# Patient Record
Sex: FEMALE | Race: WHITE | NOT HISPANIC OR LATINO
[De-identification: names, ages, dates, MRNs, and addresses within clinical notes are randomized per-mention and may not be internally consistent; named-entity substitution may affect disease eponyms.]

---

## 2017-03-20 ENCOUNTER — APPOINTMENT (OUTPATIENT)
Dept: HEART AND VASCULAR | Facility: CLINIC | Age: 82
End: 2017-03-20

## 2017-03-20 VITALS
WEIGHT: 128 LBS | HEART RATE: 86 BPM | BODY MASS INDEX: 25.8 KG/M2 | DIASTOLIC BLOOD PRESSURE: 80 MMHG | SYSTOLIC BLOOD PRESSURE: 160 MMHG | HEIGHT: 59 IN | OXYGEN SATURATION: 97 %

## 2017-03-20 DIAGNOSIS — I10 ESSENTIAL (PRIMARY) HYPERTENSION: ICD-10-CM

## 2017-03-20 DIAGNOSIS — S06.5X9A TRAUMATIC SUBDURAL HEMORRHAGE WITH LOSS OF CONSCIOUSNESS OF UNSPECIFIED DURATION, INITIAL ENCOUNTER: ICD-10-CM

## 2017-03-20 DIAGNOSIS — Z78.9 OTHER SPECIFIED HEALTH STATUS: ICD-10-CM

## 2017-03-20 DIAGNOSIS — Z85.828 PERSONAL HISTORY OF OTHER MALIGNANT NEOPLASM OF SKIN: ICD-10-CM
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
## 2017-03-20 DIAGNOSIS — H81.10 BENIGN PAROXYSMAL VERTIGO, UNSPECIFIED EAR: ICD-10-CM

## 2017-03-21 PROBLEM — Z78.9 NON-SMOKER: Status: ACTIVE | Noted: 2017-03-21
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
## 2017-03-21 PROBLEM — I10 BENIGN ESSENTIAL HYPERTENSION: Status: RESOLVED | Noted: 2017-03-21 | Resolved: 2017-03-21

## 2017-03-21 PROBLEM — S06.5X9A SUBDURAL HEMATOMA DUE TO CONCUSSION: Status: RESOLVED | Noted: 2017-03-21 | Resolved: 2017-03-21

## 2017-03-21 PROBLEM — H81.10 BPV (BENIGN POSITIONAL VERTIGO): Status: RESOLVED | Noted: 2017-03-21 | Resolved: 2017-03-21

## 2017-03-31 ENCOUNTER — APPOINTMENT (OUTPATIENT)
Dept: HEART AND VASCULAR | Facility: CLINIC | Age: 82
End: 2017-03-31

## 2017-03-31 VITALS
OXYGEN SATURATION: 96 % | DIASTOLIC BLOOD PRESSURE: 80 MMHG | BODY MASS INDEX: 25.8 KG/M2 | HEART RATE: 54 BPM | HEIGHT: 59 IN | WEIGHT: 128 LBS | SYSTOLIC BLOOD PRESSURE: 150 MMHG

## 2017-03-31 DIAGNOSIS — R55 SYNCOPE AND COLLAPSE: ICD-10-CM

## 2017-04-24 ENCOUNTER — CLINICAL ADVICE (OUTPATIENT)
Age: 82
End: 2017-04-24

## 2017-04-27 ENCOUNTER — APPOINTMENT (OUTPATIENT)
Dept: NEUROLOGY | Facility: CLINIC | Age: 82
End: 2017-04-27

## 2017-04-27 VITALS
OXYGEN SATURATION: 95 % | BODY MASS INDEX: 24.19 KG/M2 | HEIGHT: 59 IN | TEMPERATURE: 98.1 F | DIASTOLIC BLOOD PRESSURE: 73 MMHG | HEART RATE: 80 BPM | WEIGHT: 120 LBS | SYSTOLIC BLOOD PRESSURE: 148 MMHG

## 2017-04-27 DIAGNOSIS — Z82.61 FAMILY HISTORY OF ARTHRITIS: ICD-10-CM

## 2017-04-27 DIAGNOSIS — Z82.49 FAMILY HISTORY OF ISCHEMIC HEART DISEASE AND OTHER DISEASES OF THE CIRCULATORY SYSTEM: ICD-10-CM

## 2017-04-27 DIAGNOSIS — Z78.9 OTHER SPECIFIED HEALTH STATUS: ICD-10-CM

## 2017-06-27 ENCOUNTER — APPOINTMENT (OUTPATIENT)
Dept: HEART AND VASCULAR | Facility: CLINIC | Age: 82
End: 2017-06-27

## 2017-06-27 VITALS
HEART RATE: 66 BPM | BODY MASS INDEX: 24.19 KG/M2 | WEIGHT: 120 LBS | HEIGHT: 59 IN | SYSTOLIC BLOOD PRESSURE: 122 MMHG | OXYGEN SATURATION: 96 % | DIASTOLIC BLOOD PRESSURE: 68 MMHG

## 2017-06-27 DIAGNOSIS — R19.7 DIARRHEA, UNSPECIFIED: ICD-10-CM

## 2017-07-25 ENCOUNTER — APPOINTMENT (OUTPATIENT)
Dept: NEUROLOGY | Facility: CLINIC | Age: 82
End: 2017-07-25

## 2017-08-07 ENCOUNTER — APPOINTMENT (OUTPATIENT)
Dept: HEART AND VASCULAR | Facility: CLINIC | Age: 82
End: 2017-08-07
Payer: MEDICARE

## 2017-08-07 VITALS
WEIGHT: 120 LBS | HEART RATE: 70 BPM | TEMPERATURE: 98.2 F | OXYGEN SATURATION: 97 % | HEIGHT: 59 IN | DIASTOLIC BLOOD PRESSURE: 78 MMHG | SYSTOLIC BLOOD PRESSURE: 130 MMHG | BODY MASS INDEX: 24.19 KG/M2

## 2017-08-07 PROCEDURE — 99214 OFFICE O/P EST MOD 30 MIN: CPT

## 2017-10-16 ENCOUNTER — APPOINTMENT (OUTPATIENT)
Dept: HEART AND VASCULAR | Facility: CLINIC | Age: 82
End: 2017-10-16
Payer: MEDICARE

## 2017-10-16 VITALS
HEIGHT: 59 IN | SYSTOLIC BLOOD PRESSURE: 110 MMHG | DIASTOLIC BLOOD PRESSURE: 58 MMHG | TEMPERATURE: 97.7 F | BODY MASS INDEX: 23.99 KG/M2 | HEART RATE: 61 BPM | WEIGHT: 119 LBS | OXYGEN SATURATION: 95 %

## 2017-10-16 PROCEDURE — 99213 OFFICE O/P EST LOW 20 MIN: CPT

## 2017-10-17 ENCOUNTER — MED ADMIN CHARGE (OUTPATIENT)
Age: 82
End: 2017-10-17

## 2017-11-14 ENCOUNTER — APPOINTMENT (OUTPATIENT)
Dept: HEART AND VASCULAR | Facility: CLINIC | Age: 82
End: 2017-11-14
Payer: MEDICARE

## 2017-11-14 VITALS
HEART RATE: 65 BPM | OXYGEN SATURATION: 97 % | DIASTOLIC BLOOD PRESSURE: 56 MMHG | SYSTOLIC BLOOD PRESSURE: 110 MMHG | BODY MASS INDEX: 23.79 KG/M2 | TEMPERATURE: 97.7 F | WEIGHT: 118 LBS | HEIGHT: 59 IN

## 2017-11-14 PROCEDURE — 36415 COLL VENOUS BLD VENIPUNCTURE: CPT

## 2017-11-14 PROCEDURE — 99214 OFFICE O/P EST MOD 30 MIN: CPT | Mod: 25

## 2017-11-14 RX ORDER — AMLODIPINE BESYLATE 5 MG/1
5 TABLET ORAL DAILY
Qty: 90 | Refills: 0 | Status: DISCONTINUED | COMMUNITY
End: 2017-11-14

## 2017-11-15 LAB
BASOPHILS # BLD AUTO: 0.02 K/UL
BASOPHILS NFR BLD AUTO: 0.2 %
EOSINOPHIL # BLD AUTO: 0.02 K/UL
EOSINOPHIL NFR BLD AUTO: 0.2
HBA1C MFR BLD HPLC: 6 %
HCT VFR BLD CALC: 40.5 %
HGB BLD-MCNC: 13 G/DL
IMM GRANULOCYTES NFR BLD AUTO: 0.2 %
LYMPHOCYTES # BLD AUTO: 1.36 K/UL
LYMPHOCYTES NFR BLD AUTO: 14.9 %
MAN DIFF?: NORMAL
MCHC RBC-ENTMCNC: 32.1 GM/DL
MCHC RBC-ENTMCNC: 33.5 PG
MCV RBC AUTO: 104.4 FL
MONOCYTES # BLD AUTO: 1.02 K/UL
MONOCYTES NFR BLD AUTO: 11.1 %
NEUTROPHILS # BLD AUTO: 6.71 K/UL
NEUTROPHILS NFR BLD AUTO: 73.4 %
PLATELET # BLD AUTO: 216 K/UL
RBC # BLD: 3.88 M/UL
RBC # FLD: 15.7 %
TSH SERPL-ACNC: 2.17 UIU/ML
WBC # FLD AUTO: 9.15 K/UL

## 2017-11-16 LAB
ALBUMIN SERPL ELPH-MCNC: 4.3 G/DL
ALP BLD-CCNC: 69 U/L
ALT SERPL-CCNC: 19 U/L
ANION GAP SERPL CALC-SCNC: 15 MMOL/L
AST SERPL-CCNC: 22 U/L
BILIRUB SERPL-MCNC: 0.3 MG/DL
BUN SERPL-MCNC: 22 MG/DL
CALCIUM SERPL-MCNC: 9.6 MG/DL
CHLORIDE SERPL-SCNC: 98 MMOL/L
CHOLEST SERPL-MCNC: 150 MG/DL
CHOLEST/HDLC SERPL: 1.8 RATIO
CO2 SERPL-SCNC: 24 MMOL/L
CREAT SERPL-MCNC: 0.85 MG/DL
GLUCOSE SERPL-MCNC: 112 MG/DL
HDLC SERPL-MCNC: 82 MG/DL
LDLC SERPL CALC-MCNC: 58 MG/DL
POTASSIUM SERPL-SCNC: 5.1 MMOL/L
PROT SERPL-MCNC: 7.1 G/DL
SODIUM SERPL-SCNC: 137 MMOL/L
TRIGL SERPL-MCNC: 50 MG/DL

## 2017-11-17 ENCOUNTER — CLINICAL ADVICE (OUTPATIENT)
Age: 82
End: 2017-11-17

## 2017-12-15 ENCOUNTER — APPOINTMENT (OUTPATIENT)
Dept: HEART AND VASCULAR | Facility: CLINIC | Age: 82
End: 2017-12-15
Payer: MEDICARE

## 2017-12-15 VITALS
HEIGHT: 59 IN | DIASTOLIC BLOOD PRESSURE: 58 MMHG | WEIGHT: 118 LBS | SYSTOLIC BLOOD PRESSURE: 112 MMHG | OXYGEN SATURATION: 95 % | HEART RATE: 95 BPM | BODY MASS INDEX: 23.79 KG/M2

## 2017-12-15 PROCEDURE — 99214 OFFICE O/P EST MOD 30 MIN: CPT | Mod: 25

## 2017-12-15 PROCEDURE — 93000 ELECTROCARDIOGRAM COMPLETE: CPT

## 2017-12-29 ENCOUNTER — RX RENEWAL (OUTPATIENT)
Age: 82
End: 2017-12-29

## 2017-12-29 ENCOUNTER — MEDICATION RENEWAL (OUTPATIENT)
Age: 82
End: 2017-12-29

## 2017-12-30 ENCOUNTER — EMERGENCY (EMERGENCY)
Facility: HOSPITAL | Age: 82
LOS: 1 days | Discharge: HOME | End: 2017-12-30
Attending: EMERGENCY MEDICINE | Admitting: EMERGENCY MEDICINE
Payer: MEDICARE

## 2017-12-30 VITALS
SYSTOLIC BLOOD PRESSURE: 166 MMHG | OXYGEN SATURATION: 98 % | RESPIRATION RATE: 18 BRPM | HEART RATE: 77 BPM | DIASTOLIC BLOOD PRESSURE: 80 MMHG

## 2017-12-30 VITALS
SYSTOLIC BLOOD PRESSURE: 121 MMHG | OXYGEN SATURATION: 95 % | TEMPERATURE: 98 F | RESPIRATION RATE: 17 BRPM | DIASTOLIC BLOOD PRESSURE: 63 MMHG | HEART RATE: 74 BPM

## 2017-12-30 DIAGNOSIS — Z88.8 ALLERGY STATUS TO OTHER DRUGS, MEDICAMENTS AND BIOLOGICAL SUBSTANCES STATUS: ICD-10-CM

## 2017-12-30 DIAGNOSIS — I10 ESSENTIAL (PRIMARY) HYPERTENSION: ICD-10-CM

## 2017-12-30 DIAGNOSIS — E78.5 HYPERLIPIDEMIA, UNSPECIFIED: ICD-10-CM

## 2017-12-30 DIAGNOSIS — R05 COUGH: ICD-10-CM

## 2017-12-30 DIAGNOSIS — J06.9 ACUTE UPPER RESPIRATORY INFECTION, UNSPECIFIED: ICD-10-CM

## 2017-12-30 DIAGNOSIS — E03.9 HYPOTHYROIDISM, UNSPECIFIED: ICD-10-CM

## 2017-12-30 PROCEDURE — 71020: CPT | Mod: 26

## 2017-12-30 PROCEDURE — 80053 COMPREHEN METABOLIC PANEL: CPT

## 2017-12-30 PROCEDURE — 82550 ASSAY OF CK (CPK): CPT

## 2017-12-30 PROCEDURE — 81001 URINALYSIS AUTO W/SCOPE: CPT

## 2017-12-30 PROCEDURE — 82553 CREATINE MB FRACTION: CPT

## 2017-12-30 PROCEDURE — 87086 URINE CULTURE/COLONY COUNT: CPT

## 2017-12-30 PROCEDURE — 99285 EMERGENCY DEPT VISIT HI MDM: CPT

## 2017-12-30 PROCEDURE — 84484 ASSAY OF TROPONIN QUANT: CPT

## 2017-12-30 PROCEDURE — 85025 COMPLETE CBC W/AUTO DIFF WBC: CPT

## 2017-12-30 PROCEDURE — 70450 CT HEAD/BRAIN W/O DYE: CPT

## 2017-12-30 PROCEDURE — 93005 ELECTROCARDIOGRAM TRACING: CPT

## 2017-12-30 PROCEDURE — 71046 X-RAY EXAM CHEST 2 VIEWS: CPT

## 2017-12-30 PROCEDURE — 70450 CT HEAD/BRAIN W/O DYE: CPT | Mod: 26

## 2017-12-30 PROCEDURE — 99284 EMERGENCY DEPT VISIT MOD MDM: CPT | Mod: 25

## 2017-12-30 PROCEDURE — 36415 COLL VENOUS BLD VENIPUNCTURE: CPT

## 2017-12-30 NOTE — ED PROVIDER NOTE - MUSCULOSKELETAL, MLM
Spine appears normal, range of motion is not limited, no muscle or joint tenderness joint changes cw arthritis

## 2017-12-30 NOTE — ED PROVIDER NOTE - ENMT, MLM
Airway patent, Nasal  w clear rhinnorhea ,  Mouth with normal mucosa. Throat has no vesicles, no oropharyngeal exudates and uvula is midline. mild pharyngeal erythema

## 2017-12-30 NOTE — ED ADULT NURSE REASSESSMENT NOTE - NS ED NURSE REASSESS COMMENT FT1
Patient provided for rounding. Patient in no apparent distress. Resting comfortably. Patient provided for emotional support, comfort, safety, and review of plan of care. Will continue to monitor.

## 2017-12-30 NOTE — ED PROVIDER NOTE - OBJECTIVE STATEMENT
89 yo , reports several days of sore throat, fatigue, generalized weakness and rhinnorhea, no fever, no falls, no presyncope/syncope, no CP no SOB , dry non productive cough, no abd pain no n/v/d , no urinary symptoms. no abd pain.  no sick contacts.

## 2017-12-30 NOTE — ED ADULT NURSE NOTE - OBJECTIVE STATEMENT
89 y/o female BIBA for productive cough with yellow sputum, dizziness, and chronic abd pain. Patient states "I keep feeling like I am going to pass out and feel dizzy. My throat has been hurting for the past couple of days and I am coughing up yellow stuff. My stomach hurts every once in a while so I figured I would let you know. I fell this morning." No swelling of lymph nodes noted, tonsils +1, tongue and oral mucosa pink and moist. Denies LOC, head trauma. No active bleeding, trauma, bruising, obvious deformity noted. Patient is A&O x3 and +PERRLA. Patient is NAD, speaking full sentences, chest rise is equal and symmetrical bilaterally, trachea midline and lung sounds are clear in upper/lower bases bilaterally. Abdomen is SNT upon palpation, +bowel sounds in all 4 quadrants, no pulsatile mass noted. Good strength in upper/lower extremities bilaterally against resistance, normal sensation in upper/lower extremities face and cheek bilaterally when touched with finger, facial expressions are equal and symmetrical bilaterally when asked to smile, frown and puff out cheeks.No edema noted in lower extremities bilaterally, cap refill less than 3 seconds bilaterally, dorsal pedal pulses are good bilaterally. Patient resting comfortably and aware with plan of care, will continue to monitor. Patient walks at baseline with walker. Aide at bedside.

## 2017-12-30 NOTE — ED PROVIDER NOTE - MEDICAL DECISION MAKING DETAILS
Patient with URI / laryngitis, suspect viral, no apparent pna and pt non toxic appearing, due to age and no help overnight advised stay one night for observation and PT eval, pt uninterested, wants to go home, advised pcp f/u and discussed emergent return instructions . given ENT clinic number on pt's request however suspect laryngitis just viral and unnecessary unless voice changes do not resolve as expected.

## 2017-12-30 NOTE — ED PROVIDER NOTE - PMH
Essential hypertension  Hypertension  Hyperlipidemia  Hyperlipidemia  Hypothyroidism  Hypothyroid  Insomnia  Insomnia

## 2017-12-30 NOTE — ED ADULT TRIAGE NOTE - ARRIVAL INFO ADDITIONAL COMMENTS
Pt BIBA with c/o of sore throat, moist cough (yellow phlegm), generalized weakness. Onset was 2 days ago. Denies CP, SOB, fever, chills, N+V+d . Pt has flu shot.

## 2018-01-24 ENCOUNTER — APPOINTMENT (OUTPATIENT)
Dept: HEART AND VASCULAR | Facility: CLINIC | Age: 83
End: 2018-01-24
Payer: MEDICARE

## 2018-01-24 VITALS
HEART RATE: 74 BPM | TEMPERATURE: 96.9 F | BODY MASS INDEX: 23.18 KG/M2 | OXYGEN SATURATION: 95 % | WEIGHT: 115 LBS | HEIGHT: 59 IN | SYSTOLIC BLOOD PRESSURE: 140 MMHG | DIASTOLIC BLOOD PRESSURE: 58 MMHG

## 2018-01-24 PROCEDURE — 99214 OFFICE O/P EST MOD 30 MIN: CPT

## 2018-01-24 RX ORDER — SIMVASTATIN 20 MG/1
20 TABLET, FILM COATED ORAL
Refills: 0 | Status: DISCONTINUED | COMMUNITY
End: 2018-01-24

## 2018-01-24 RX ORDER — METOPROLOL SUCCINATE 100 MG/1
TABLET, EXTENDED RELEASE ORAL DAILY
Refills: 0 | Status: DISCONTINUED | COMMUNITY
End: 2018-01-24

## 2018-01-24 RX ORDER — TRAZODONE HYDROCHLORIDE 50 MG/1
50 TABLET ORAL
Refills: 0 | Status: DISCONTINUED | COMMUNITY
End: 2018-01-24

## 2018-01-24 RX ORDER — OMEPRAZOLE 20 MG/1
20 CAPSULE, DELAYED RELEASE ORAL
Refills: 0 | Status: DISCONTINUED | COMMUNITY
End: 2018-01-24

## 2018-01-24 RX ORDER — DIGOXIN 125 UG/1
125 TABLET ORAL DAILY
Qty: 90 | Refills: 0 | Status: DISCONTINUED | COMMUNITY
End: 2018-01-24

## 2018-01-24 RX ORDER — CHROMIUM 200 MCG
1000 TABLET ORAL DAILY
Refills: 0 | Status: DISCONTINUED | COMMUNITY
End: 2018-01-24

## 2018-01-28 ENCOUNTER — INPATIENT (INPATIENT)
Facility: HOSPITAL | Age: 83
LOS: 2 days | Discharge: NURSING, REHAB OR LONG-TERM CARE | DRG: 206 | End: 2018-01-31
Attending: STUDENT IN AN ORGANIZED HEALTH CARE EDUCATION/TRAINING PROGRAM | Admitting: STUDENT IN AN ORGANIZED HEALTH CARE EDUCATION/TRAINING PROGRAM
Payer: MEDICARE

## 2018-01-28 VITALS
TEMPERATURE: 97 F | HEART RATE: 63 BPM | SYSTOLIC BLOOD PRESSURE: 191 MMHG | DIASTOLIC BLOOD PRESSURE: 83 MMHG | OXYGEN SATURATION: 100 % | RESPIRATION RATE: 16 BRPM

## 2018-01-28 DIAGNOSIS — S01.01XA LACERATION WITHOUT FOREIGN BODY OF SCALP, INITIAL ENCOUNTER: ICD-10-CM
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
## 2018-01-28 DIAGNOSIS — Z90.49 ACQUIRED ABSENCE OF OTHER SPECIFIED PARTS OF DIGESTIVE TRACT: Chronic | ICD-10-CM

## 2018-01-28 DIAGNOSIS — I10 ESSENTIAL (PRIMARY) HYPERTENSION: ICD-10-CM

## 2018-01-28 DIAGNOSIS — I49.9 CARDIAC ARRHYTHMIA, UNSPECIFIED: ICD-10-CM

## 2018-01-28 DIAGNOSIS — Z29.9 ENCOUNTER FOR PROPHYLACTIC MEASURES, UNSPECIFIED: ICD-10-CM

## 2018-01-28 DIAGNOSIS — R63.8 OTHER SYMPTOMS AND SIGNS CONCERNING FOOD AND FLUID INTAKE: ICD-10-CM

## 2018-01-28 DIAGNOSIS — R10.9 UNSPECIFIED ABDOMINAL PAIN: ICD-10-CM

## 2018-01-28 DIAGNOSIS — R07.9 CHEST PAIN, UNSPECIFIED: ICD-10-CM

## 2018-01-28 DIAGNOSIS — Z98.890 OTHER SPECIFIED POSTPROCEDURAL STATES: Chronic | ICD-10-CM

## 2018-01-28 DIAGNOSIS — E03.9 HYPOTHYROIDISM, UNSPECIFIED: ICD-10-CM

## 2018-01-28 LAB
ALBUMIN SERPL ELPH-MCNC: 4 G/DL — SIGNIFICANT CHANGE UP (ref 3.3–5)
ALP SERPL-CCNC: 57 U/L — SIGNIFICANT CHANGE UP (ref 40–120)
ALT FLD-CCNC: 21 U/L — SIGNIFICANT CHANGE UP (ref 10–45)
ANION GAP SERPL CALC-SCNC: 14 MMOL/L — SIGNIFICANT CHANGE UP (ref 5–17)
APPEARANCE UR: CLEAR — SIGNIFICANT CHANGE UP
APTT BLD: 29.1 SEC — SIGNIFICANT CHANGE UP (ref 27.5–37.4)
AST SERPL-CCNC: 29 U/L — SIGNIFICANT CHANGE UP (ref 10–40)
BASOPHILS NFR BLD AUTO: 0.2 % — SIGNIFICANT CHANGE UP (ref 0–2)
BILIRUB SERPL-MCNC: 0.6 MG/DL — SIGNIFICANT CHANGE UP (ref 0.2–1.2)
BILIRUB UR-MCNC: NEGATIVE — SIGNIFICANT CHANGE UP
BUN SERPL-MCNC: 15 MG/DL — SIGNIFICANT CHANGE UP (ref 7–23)
CALCIUM SERPL-MCNC: 9.2 MG/DL — SIGNIFICANT CHANGE UP (ref 8.4–10.5)
CHLORIDE SERPL-SCNC: 96 MMOL/L — SIGNIFICANT CHANGE UP (ref 96–108)
CK MB CFR SERPL CALC: 9.5 NG/ML — HIGH (ref 0–6.7)
CK SERPL-CCNC: 173 U/L — HIGH (ref 25–170)
CO2 SERPL-SCNC: 24 MMOL/L — SIGNIFICANT CHANGE UP (ref 22–31)
COLOR SPEC: YELLOW — SIGNIFICANT CHANGE UP
CREAT SERPL-MCNC: 0.52 MG/DL — SIGNIFICANT CHANGE UP (ref 0.5–1.3)
DIFF PNL FLD: (no result)
EOSINOPHIL NFR BLD AUTO: 0.5 % — SIGNIFICANT CHANGE UP (ref 0–6)
EPI CELLS # UR: (no result) /HPF (ref 0–5)
GLUCOSE SERPL-MCNC: 111 MG/DL — HIGH (ref 70–99)
GLUCOSE UR QL: NEGATIVE — SIGNIFICANT CHANGE UP
HCT VFR BLD CALC: 38.5 % — SIGNIFICANT CHANGE UP (ref 34.5–45)
HGB BLD-MCNC: 12.8 G/DL — SIGNIFICANT CHANGE UP (ref 11.5–15.5)
INR BLD: 1.18 — HIGH (ref 0.88–1.16)
KETONES UR-MCNC: NEGATIVE — SIGNIFICANT CHANGE UP
LEUKOCYTE ESTERASE UR-ACNC: NEGATIVE — SIGNIFICANT CHANGE UP
LIDOCAIN IGE QN: 15 U/L — SIGNIFICANT CHANGE UP (ref 7–60)
LYMPHOCYTES # BLD AUTO: 11 % — LOW (ref 13–44)
MCHC RBC-ENTMCNC: 33.1 PG — SIGNIFICANT CHANGE UP (ref 27–34)
MCHC RBC-ENTMCNC: 33.2 G/DL — SIGNIFICANT CHANGE UP (ref 32–36)
MCV RBC AUTO: 99.5 FL — SIGNIFICANT CHANGE UP (ref 80–100)
MONOCYTES NFR BLD AUTO: 9.8 % — SIGNIFICANT CHANGE UP (ref 2–14)
NEUTROPHILS NFR BLD AUTO: 78.5 % — HIGH (ref 43–77)
NITRITE UR-MCNC: NEGATIVE — SIGNIFICANT CHANGE UP
PH UR: 6.5 — SIGNIFICANT CHANGE UP (ref 5–8)
PLATELET # BLD AUTO: 196 K/UL — SIGNIFICANT CHANGE UP (ref 150–400)
POTASSIUM SERPL-MCNC: 4.5 MMOL/L — SIGNIFICANT CHANGE UP (ref 3.5–5.3)
POTASSIUM SERPL-SCNC: 4.5 MMOL/L — SIGNIFICANT CHANGE UP (ref 3.5–5.3)
PROT SERPL-MCNC: 7.1 G/DL — SIGNIFICANT CHANGE UP (ref 6–8.3)
PROT UR-MCNC: (no result) MG/DL
PROTHROM AB SERPL-ACNC: 13.1 SEC — HIGH (ref 9.8–12.7)
RBC # BLD: 3.87 M/UL — SIGNIFICANT CHANGE UP (ref 3.8–5.2)
RBC # FLD: 14.7 % — SIGNIFICANT CHANGE UP (ref 10.3–16.9)
RBC CASTS # UR COMP ASSIST: (no result) /HPF
SODIUM SERPL-SCNC: 134 MMOL/L — LOW (ref 135–145)
SP GR SPEC: <=1.005 — SIGNIFICANT CHANGE UP (ref 1–1.03)
TROPONIN T SERPL-MCNC: <0.01 NG/ML — SIGNIFICANT CHANGE UP (ref 0–0.01)
UROBILINOGEN FLD QL: 0.2 E.U./DL — SIGNIFICANT CHANGE UP
WBC # BLD: 8.8 K/UL — SIGNIFICANT CHANGE UP (ref 3.8–10.5)
WBC # FLD AUTO: 8.8 K/UL — SIGNIFICANT CHANGE UP (ref 3.8–10.5)

## 2018-01-28 PROCEDURE — 72190 X-RAY EXAM OF PELVIS: CPT | Mod: 26

## 2018-01-28 PROCEDURE — 93010 ELECTROCARDIOGRAM REPORT: CPT | Mod: 59

## 2018-01-28 PROCEDURE — 99285 EMERGENCY DEPT VISIT HI MDM: CPT | Mod: 25

## 2018-01-28 PROCEDURE — 12001 RPR S/N/AX/GEN/TRNK 2.5CM/<: CPT

## 2018-01-28 PROCEDURE — 70450 CT HEAD/BRAIN W/O DYE: CPT | Mod: 26

## 2018-01-28 PROCEDURE — 71045 X-RAY EXAM CHEST 1 VIEW: CPT | Mod: 26

## 2018-01-28 PROCEDURE — 99223 1ST HOSP IP/OBS HIGH 75: CPT | Mod: GC

## 2018-01-28 RX ORDER — ACETAMINOPHEN 500 MG
650 TABLET ORAL ONCE
Qty: 0 | Refills: 0 | Status: COMPLETED | OUTPATIENT
Start: 2018-01-28 | End: 2018-01-28

## 2018-01-28 RX ORDER — ASPIRIN/CALCIUM CARB/MAGNESIUM 324 MG
325 TABLET ORAL DAILY
Qty: 0 | Refills: 0 | Status: DISCONTINUED | OUTPATIENT
Start: 2018-01-28 | End: 2018-01-29

## 2018-01-28 RX ORDER — METOPROLOL TARTRATE 50 MG
25 TABLET ORAL
Qty: 0 | Refills: 0 | Status: DISCONTINUED | OUTPATIENT
Start: 2018-01-28 | End: 2018-01-31

## 2018-01-28 RX ORDER — ACETAMINOPHEN 500 MG
650 TABLET ORAL EVERY 6 HOURS
Qty: 0 | Refills: 0 | Status: DISCONTINUED | OUTPATIENT
Start: 2018-01-28 | End: 2018-01-31

## 2018-01-28 RX ORDER — HYDRALAZINE HCL 50 MG
10 TABLET ORAL ONCE
Qty: 0 | Refills: 0 | Status: COMPLETED | OUTPATIENT
Start: 2018-01-28 | End: 2018-01-29

## 2018-01-28 RX ORDER — LEVOTHYROXINE SODIUM 125 MCG
75 TABLET ORAL DAILY
Qty: 0 | Refills: 0 | Status: DISCONTINUED | OUTPATIENT
Start: 2018-01-28 | End: 2018-01-31

## 2018-01-28 RX ORDER — HEPARIN SODIUM 5000 [USP'U]/ML
5000 INJECTION INTRAVENOUS; SUBCUTANEOUS EVERY 12 HOURS
Qty: 0 | Refills: 0 | Status: DISCONTINUED | OUTPATIENT
Start: 2018-01-28 | End: 2018-01-31

## 2018-01-28 RX ADMIN — Medication 325 MILLIGRAM(S): at 20:37

## 2018-01-28 RX ADMIN — Medication 650 MILLIGRAM(S): at 20:36

## 2018-01-28 RX ADMIN — Medication 650 MILLIGRAM(S): at 22:10

## 2018-01-28 NOTE — PHYSICAL THERAPY INITIAL EVALUATION ADULT - CRITERIA FOR SKILLED THERAPEUTIC INTERVENTIONS
functional limitations in following categories/rehab potential/impairments found/risk reduction/prevention/therapy frequency/anticipated discharge recommendation

## 2018-01-28 NOTE — H&P ADULT - PROBLEM SELECTOR PLAN 1
Patient with one year history of falls. Unclear etiology. No dizziness, LOC, neuro exam benign. Patient is unsteady, underweight and has poor vision so this may be contributing to fall.   -seen by PT, recommend ALYSHA  -will check TSH, Vitamin D, RPR

## 2018-01-28 NOTE — H&P ADULT - PROBLEM SELECTOR PLAN 6
Patient reports history of atrial fibrillation, has been on Couamdin in the past but was told by her physician to stop the medication. EKG currently shows sinus arrhythmia.  -obtain collateral from Dr. Prieto

## 2018-01-28 NOTE — ED ADULT TRIAGE NOTE - CHIEF COMPLAINT QUOTE
Pt BIBA from home c/o mechanical fall with laceration to posterior scalp. Denies LOC or blood thinners. Pt ambulatory with cane.

## 2018-01-28 NOTE — ED PROVIDER NOTE - OBJECTIVE STATEMENT
89 y/o female with hx of hypothyroidism, HTN c/o fall. pt states slipped and fell striking back of head on kitchen floor this am. pt notes used medical alert to get help. Pt denies loc, neck or back pain. no numbness, tingling or weakness. no visual changes. pt notes pain to right posterior hip. Also pt notes multiple falls in past 6 months but has no injured herself. no further complaints. Pt denies blood thinner use.

## 2018-01-28 NOTE — H&P ADULT - PMH
Arrhythmia    DVT (deep venous thrombosis)    Essential hypertension  Hypertension  Hyperlipidemia  Hyperlipidemia  Hypothyroidism  Hypothyroid  Insomnia  Insomnia  PE (pulmonary thromboembolism)

## 2018-01-28 NOTE — PHYSICAL THERAPY INITIAL EVALUATION ADULT - GENERAL OBSERVATIONS, REHAB EVAL
Patient received with gauze saturated on posterior cranium. Left as found in ED Patient received with gauze saturated on posterior cranium. Left as found in ED pain in hip 8/10

## 2018-01-28 NOTE — ED PROVIDER NOTE - PROGRESS NOTE DETAILS
pt now reports left lower rib discomfort x 30 minutes. mild tenderness to area on exam. will repeat ecg and check cxr. discussed case and ECG changes with Dr Guthrie cardiology. recommends admission to medicine

## 2018-01-28 NOTE — ED PROVIDER NOTE - CARE PLAN
Principal Discharge DX:	Head injury  Secondary Diagnosis:	Laceration of scalp  Secondary Diagnosis:	Unsteady gait

## 2018-01-28 NOTE — ED ADULT NURSE NOTE - OBJECTIVE STATEMENT
Trip and fall in kitchen , denies LOC , Hit head ( back of head) laceration on back of head. Denies Blurred vision, Trip and fall in kitchen , denies LOC , Hit head ( back of head) laceration on back of head. Denies Blurred vision, unable to ambulate usually ambulating with walker, also c/o lower back and buttock pain Trip and fall in kitchen , denies LOC , Hit head ( back of head) laceration on back of head. Denies Blurred vision, unable to ambulate usually ambulating with walker/ cane, also c/o lower back and buttock pain

## 2018-01-28 NOTE — ED PROVIDER NOTE - ENMT, MLM
Airway patent, Nasal mucosa clear. Mouth with normal mucosa. Throat has no vesicles, no oropharyngeal exudates and uvula is midline. + hemtoma and laceration to posterior scalp. no bony tenderness.

## 2018-01-28 NOTE — H&P ADULT - NSHPSOCIALHISTORY_GEN_ALL_CORE
Patient , retired, lives alone, walks with a cane. Has a daughter who lives in [place]. No smoking, tobacco use or illicit drug use.

## 2018-01-28 NOTE — H&P ADULT - PROBLEM SELECTOR PLAN 4
BP 190s/90s, asymptomatic. Denies high blood pressure on home readings.   -will give Hydralazine 10mg PO x1  -c/w Metoprolol 25mg BID BP 190s/90s, asymptomatic. Denies high blood pressure on home readings.   -will give Hydralazine 10mg PO x1  -c/w Metoprolol 25mg BID  -added Norvasc 5mg QD

## 2018-01-28 NOTE — H&P ADULT - PROBLEM SELECTOR PLAN 3
Patient with pleuritic chest pain and chest wall tenderness. Likely costrochondritis. Did have EKG changes with TWI in lead III and aVF, resolved on repeat EKG. Trops negative.  -will obtain one more set of trop and repeat EKG in the AM  -Tylenol PRN for pain

## 2018-01-28 NOTE — H&P ADULT - HISTORY OF PRESENT ILLNESS
89yo F with HTN, HLD, A. fib not on AC, DVT/PE, hypothyroidism presents s/p fall. Patient reports she was standing in her kitchen when she fell backwards and hit her head. She denies LOC or dizziness but cannot really provide history of how she fell. She states that she falls often, about four times per day for the past year. Does not know why this happens, walks with a cane. She goes to physical therapy twice a week. She reports having a cardio workup and was told she is "OK." Additionally, patient reports epigastric pain x 2 months described as a band around her 89yo F with HTN, HLD, A. fib not on AC, DVT/PE, hypothyroidism presents s/p fall. Patient reports she was standing in her kitchen when she fell backwards and hit her head. She denies LOC or dizziness but cannot really provide history of how she fell. She states that she falls often, about four times per day for the past year. Does not know why this happens, walks with a cane. She goes to physical therapy twice a week. She reports having a cardio workup and was told she is "OK." Additionally, patient reports epigastric pain x 2 months described as a band around her diaphragm Pain is dull, moderate, no radiation, not associated with food. She states its intermittent. Last c-scope 3 years ago, was "normal." While patient was in ER, she developed sudden onset CP which was evaluated in the ED. She states pain is persistent, 5/10, sharp, non radiating and worse on inspiration. Not improved with Tylenol. She reports she occasionally gets this kind of pain, usually associated with heart palpitations Currently, denies fever, chills, N/V/D, dysuria.     In the ED, VS: 97.0 191/83 63 16 100% RA  Given Tylenol 650mg PO, ASA 325mg PO

## 2018-01-28 NOTE — ED PROVIDER NOTE - MEDICAL DECISION MAKING DETAILS
head injury and lac without loc. neuro exam intact. ct head no acute pathology. lac repaired with staples. pelvic x-ray no acute fx. pt with unsteady gait and difficulty getting out of bed. PT consulted and recommend admission for rehab placement. case d/w Dr Serra covering for Dr Prieto and recommend admit to hospitalist.  Upon review of pt's chart noted to have hx of a fib and PE. pt denies blood thinner use. head injury and lac without loc. neuro exam intact. ct head no acute pathology. lac repaired with staples. pelvic x-ray no acute fx. pt with unsteady gait and difficulty getting out of bed. PT consulted and recommend admission for rehab placement. case d/w Dr Serra covering for Dr Prieto and recommend admit to hospitalist.  Upon review of pt's chart noted to have hx of a fib and PE. pt denies blood thinner use. case d/w and ecg reviewed with Dr Ring. Case d/w and ecg reviewed with Dr Jennings cardiology and she recommends medicine admission

## 2018-01-28 NOTE — ED ADULT NURSE REASSESSMENT NOTE - NS ED NURSE REASSESS COMMENT FT1
Pt received from [facility] RN for ekg changes and chest pain. Pt c.o cp of a 3:10 at this time and placed on telemetry and pulse ox monitoring at this time. Pt has noted laceration on back of head with 7 staples present. Pt is A&Ox3 at this time. Pt fall precaution initiated and pt has red socks and fall band on. Pt denies sob, numbness or tingling to bl upper and lower extremities at this time and c.o headache of a 6:10 at this time. Pt stable and will continue to monitor.

## 2018-01-28 NOTE — H&P ADULT - NSHPPHYSICALEXAM_GEN_ALL_CORE
T(C): 36.3 (01-28-18 @ 14:07), Max: 36.3 (01-28-18 @ 14:07)  T(F): 97.3 (01-28-18 @ 14:07), Max: 97.3 (01-28-18 @ 14:07)  HR: 64 (01-28-18 @ 22:10) (63 - 64)  BP: 192/58 (01-28-18 @ 22:10) (168/82 - 192/58)  BP(mean): --  RR: 17 (01-28-18 @ 22:10) (16 - 17)  SpO2: 95% (01-28-18 @ 22:10) (95% - 100%)  Wt(kg): --    Constitutional: thin, resting comfortably in bed; NAD  Head: NC/AT  Eyes: PERRL, EOMI, anicteric sclera  ENT: no nasal discharge; uvula midline, no oropharyngeal erythema or exudates; MMM  Neck: supple; no JVD or thyromegaly  Respiratory: CTA B/L; no W/R/R,   Cardiac: +S1/S2; irregularly irregular, no M/R/G; pain to palpation along 4th IC space in the anterior axillary line  Gastrointestinal: soft, nondistended, tender to deep palpation in the epigastric region; no rebound or guarding; +BSx4  Back: spine midline, no bony tenderness or step-offs; no CVAT B/L  Extremities: WWP, no clubbing or cyanosis; trace edema  Musculoskeletal: NROM x4; no joint swelling, tenderness or erythema  Vascular: 2+ radial, DP/PT pulses B/L  Dermatologic: skin warm, dry and intact; no rashes, wounds, or scars  Lymphatic: no submandibular or cervical LAD  Neurologic: AAOx3; CNII-XII grossly intact; no focal deficits  Psychiatric: affect and characteristics of appearance, verbalizations, behaviors are appropriate T(C): 36.3 (01-28-18 @ 14:07), Max: 36.3 (01-28-18 @ 14:07)  T(F): 97.3 (01-28-18 @ 14:07), Max: 97.3 (01-28-18 @ 14:07)  HR: 64 (01-28-18 @ 22:10) (63 - 64)  BP: 192/58 (01-28-18 @ 22:10) (168/82 - 192/58)  BP(mean): --  RR: 17 (01-28-18 @ 22:10) (16 - 17)  SpO2: 95% (01-28-18 @ 22:10) (95% - 100%)  Wt(kg): --    Constitutional: thin, resting comfortably in bed; NAD  Head: bandaged laceration in the occipital, small amount of blood on gauze  Eyes: PERRL, EOMI, anicteric sclera  ENT: no nasal discharge; uvula midline, no oropharyngeal erythema or exudates; MMM  Neck: supple; no JVD or thyromegaly  Respiratory: CTA B/L; no W/R/R,   Cardiac: +S1/S2; irregularly irregular, no M/R/G; pain to palpation along 4th IC space in the anterior axillary line  Gastrointestinal: soft, nondistended, tender to deep palpation in the epigastric region; no rebound or guarding; +BSx4  Back: spine midline, no bony tenderness or step-offs; no CVAT B/L  Extremities: WWP, no clubbing or cyanosis; trace edema  Musculoskeletal: NROM x4; no joint swelling, tenderness or erythema  Vascular: 2+ radial, DP/PT pulses B/L  Dermatologic: skin warm, dry and intact; no rashes, wounds, or scars  Lymphatic: no submandibular or cervical LAD  Neurologic: AAOx3; CNII-XII grossly intact; no focal deficits  Psychiatric: affect and characteristics of appearance, verbalizations, behaviors are appropriate T(C): 36.3 (01-28-18 @ 14:07), Max: 36.3 (01-28-18 @ 14:07)  T(F): 97.3 (01-28-18 @ 14:07), Max: 97.3 (01-28-18 @ 14:07)  HR: 64 (01-28-18 @ 22:10) (63 - 64)  BP: 192/58 (01-28-18 @ 22:10) (168/82 - 192/58)  BP(mean): --  RR: 17 (01-28-18 @ 22:10) (16 - 17)  SpO2: 95% (01-28-18 @ 22:10) (95% - 100%)  Wt(kg): --    Constitutional: thin, resting comfortably in bed; NAD  Head: bandaged laceration in the occipital, small amount of blood on gauze  Eyes: PERRL, EOMI, anicteric sclera  ENT: no nasal discharge; uvula midline, no oropharyngeal erythema or exudates; MMM  Neck: supple; no JVD or thyromegaly  Respiratory: CTA B/L; no W/R/R,   Cardiac: +S1/S2; irregularly irregular, no M/R/G; pain to palpation along 4th IC space in the anterior axillary line on the L  Gastrointestinal: soft, nondistended, tender to deep palpation in the epigastric region; no rebound or guarding; +BSx4  Back: spine midline, no bony tenderness or step-offs; no CVAT B/L  Extremities: WWP, no clubbing or cyanosis; trace edema  Musculoskeletal: NROM x4; no joint swelling, tenderness or erythema  Vascular: 2+ radial, DP/PT pulses B/L  Dermatologic: skin warm, dry and intact; no rashes, wounds, or scars  Lymphatic: no submandibular or cervical LAD  Neurologic: AAOx3; CNII-XII grossly intact; no focal deficits  Psychiatric: affect and characteristics of appearance, verbalizations, behaviors are appropriate

## 2018-01-28 NOTE — ED PROVIDER NOTE - MUSCULOSKELETAL, MLM
Spine appears normal, range of motion is not limited. pelvis non tender. no bruising or redness. no midline spinal tenderness. b/l hips non tender and f/u ROM. pt able to ambulate with assistance

## 2018-01-28 NOTE — H&P ADULT - ATTENDING COMMENTS
patient seen and examined    reviewed vs, labs, available radiological reports/ studies and/or ekg     agree w/ PE findings as above w/ additions/ exceptions/ pertinent findings:   thin, elderly, female, NAD, awake, alert, MMM, no JVD, s1s2, lungs CTA b/l nontender abdomen, no lower ext tenderness b/l , trace edema b/l   1. Fall  2. Laceration of scalp  3.  Chest pain, unspecified, other  4. Hypertensive urgency  5. Abdominal pain  6. Arrhythmia  7. Hypothyroidism patient seen and examined    reviewed vs, labs, available radiological reports/ studies and/or ekg     agree w/ PE findings as above w/ additions/ exceptions/ pertinent findings:   thin, elderly, female, NAD, awake, alert, MMM, no JVD, s1s2, lungs CTA b/l , reports mild Left sided pain as above; nontender abdomen, no lower ext tenderness b/l , trace edema b/l   1. Fall  2. Laceration of scalp  3.  Chest pain, unspecified, other  4. Hypertensive urgency  5. Abdominal pain  6. Arrhythmia  7. Hypothyroidism patient seen and examined    reviewed vs, labs, available radiological reports/ studies and/or ekg     agree w/ PE findings as above w/ additions/ exceptions/ pertinent findings:   thin, elderly, female, NAD, awake, alert, staples in place at occipital region of head w/ dry blood, mild amount of fresh blood and matted hair; MMM, no JVD, s1s2, lungs CTA b/l , reports mild Left sided pain as above; nontender abdomen, no lower ext tenderness b/l , trace edema b/l       1. frequent Falls: PT recommended ALYSHA:  follow up labs, may need further outpt neuro or cards evaluation  2. Laceration of scalp: plan as above   3.  Chest pain: appears MSK, follow up trops , repeat EKG,  pain control   4. Hypertensive urgency: added norvasc w/ improved effect, monitor BP  5. Abdominal pain: monitor for recurrence  6. Arrhythmia: obtain collateral from pt's cardiologist    7. Hypothyroidism: as above

## 2018-01-28 NOTE — H&P ADULT - NSHPLABSRESULTS_GEN_ALL_CORE
12.8   8.8   )-----------( 196      ( 28 Jan 2018 14:57 )             38.5     01-28    134<L>  |  96  |  15  ----------------------------<  111<H>  4.5   |  24  |  0.52    Ca    9.2      28 Jan 2018 14:58    TPro  7.1  /  Alb  4.0  /  TBili  0.6  /  DBili  x   /  AST  29  /  ALT  21  /  AlkPhos  57  01-28    PT/INR - ( 28 Jan 2018 14:58 )   PT: 13.1 sec;   INR: 1.18          PTT - ( 28 Jan 2018 14:58 )  PTT:29.1 sec  Urinalysis Basic - ( 28 Jan 2018 15:20 )    Color: Yellow / Appearance: Clear / SG: <=1.005 / pH: x  Gluc: x / Ketone: NEGATIVE  / Bili: Negative / Urobili: 0.2 E.U./dL   Blood: x / Protein: Trace mg/dL / Nitrite: NEGATIVE   Leuk Esterase: NEGATIVE / RBC: 5-10 /HPF / WBC x   Sq Epi: x / Non Sq Epi: Moderate /HPF / Bacteria: x      CARDIAC MARKERS ( 28 Jan 2018 20:35 )  x     / <0.01 ng/mL / 173 U/L / x     / 9.5 ng/mL    RADIOLOGY, EKG & ADDITIONAL TESTS: Reviewed.

## 2018-01-29 ENCOUNTER — APPOINTMENT (OUTPATIENT)
Dept: HEART AND VASCULAR | Facility: CLINIC | Age: 83
End: 2018-01-29

## 2018-01-29 DIAGNOSIS — I16.0 HYPERTENSIVE URGENCY: ICD-10-CM

## 2018-01-29 DIAGNOSIS — R29.6 REPEATED FALLS: ICD-10-CM

## 2018-01-29 LAB
24R-OH-CALCIDIOL SERPL-MCNC: 25.2 NG/ML — LOW (ref 30–80)
ANION GAP SERPL CALC-SCNC: 13 MMOL/L — SIGNIFICANT CHANGE UP (ref 5–17)
BUN SERPL-MCNC: 13 MG/DL — SIGNIFICANT CHANGE UP (ref 7–23)
CALCIUM SERPL-MCNC: 9.2 MG/DL — SIGNIFICANT CHANGE UP (ref 8.4–10.5)
CHLORIDE SERPL-SCNC: 95 MMOL/L — LOW (ref 96–108)
CK MB CFR SERPL CALC: 5.7 NG/ML — SIGNIFICANT CHANGE UP (ref 0–6.7)
CK SERPL-CCNC: 106 U/L — SIGNIFICANT CHANGE UP (ref 25–170)
CO2 SERPL-SCNC: 26 MMOL/L — SIGNIFICANT CHANGE UP (ref 22–31)
CREAT SERPL-MCNC: 0.51 MG/DL — SIGNIFICANT CHANGE UP (ref 0.5–1.3)
GLUCOSE SERPL-MCNC: 105 MG/DL — HIGH (ref 70–99)
MAGNESIUM SERPL-MCNC: 1.8 MG/DL — SIGNIFICANT CHANGE UP (ref 1.6–2.6)
POTASSIUM SERPL-MCNC: 3.6 MMOL/L — SIGNIFICANT CHANGE UP (ref 3.5–5.3)
POTASSIUM SERPL-SCNC: 3.6 MMOL/L — SIGNIFICANT CHANGE UP (ref 3.5–5.3)
SODIUM SERPL-SCNC: 134 MMOL/L — LOW (ref 135–145)
T PALLIDUM AB TITR SER: NEGATIVE — SIGNIFICANT CHANGE UP
TROPONIN T SERPL-MCNC: <0.01 NG/ML — SIGNIFICANT CHANGE UP (ref 0–0.01)
TSH SERPL-MCNC: 0.88 UIU/ML — SIGNIFICANT CHANGE UP (ref 0.35–4.94)
VIT B12 SERPL-MCNC: 564 PG/ML — SIGNIFICANT CHANGE UP (ref 232–1245)

## 2018-01-29 PROCEDURE — 99233 SBSQ HOSP IP/OBS HIGH 50: CPT | Mod: GC

## 2018-01-29 RX ORDER — LANOLIN ALCOHOL/MO/W.PET/CERES
5 CREAM (GRAM) TOPICAL AT BEDTIME
Qty: 0 | Refills: 0 | Status: DISCONTINUED | OUTPATIENT
Start: 2018-01-29 | End: 2018-01-31

## 2018-01-29 RX ORDER — IBUPROFEN 200 MG
400 TABLET ORAL ONCE
Qty: 0 | Refills: 0 | Status: COMPLETED | OUTPATIENT
Start: 2018-01-29 | End: 2018-01-29

## 2018-01-29 RX ORDER — ACETAMINOPHEN 500 MG
1000 TABLET ORAL ONCE
Qty: 0 | Refills: 0 | Status: DISCONTINUED | OUTPATIENT
Start: 2018-01-29 | End: 2018-01-29

## 2018-01-29 RX ORDER — POLYETHYLENE GLYCOL 3350 17 G/17G
17 POWDER, FOR SOLUTION ORAL DAILY
Qty: 0 | Refills: 0 | Status: DISCONTINUED | OUTPATIENT
Start: 2018-01-29 | End: 2018-01-31

## 2018-01-29 RX ORDER — ACETAMINOPHEN 500 MG
600 TABLET ORAL ONCE
Qty: 0 | Refills: 0 | Status: COMPLETED | OUTPATIENT
Start: 2018-01-29 | End: 2018-01-29

## 2018-01-29 RX ORDER — AMLODIPINE BESYLATE 2.5 MG/1
5 TABLET ORAL DAILY
Qty: 0 | Refills: 0 | Status: DISCONTINUED | OUTPATIENT
Start: 2018-01-29 | End: 2018-01-31

## 2018-01-29 RX ORDER — POTASSIUM CHLORIDE 20 MEQ
40 PACKET (EA) ORAL ONCE
Qty: 0 | Refills: 0 | Status: COMPLETED | OUTPATIENT
Start: 2018-01-29 | End: 2018-01-29

## 2018-01-29 RX ADMIN — Medication 400 MILLIGRAM(S): at 01:21

## 2018-01-29 RX ADMIN — HEPARIN SODIUM 5000 UNIT(S): 5000 INJECTION INTRAVENOUS; SUBCUTANEOUS at 05:26

## 2018-01-29 RX ADMIN — Medication 240 MILLIGRAM(S): at 22:43

## 2018-01-29 RX ADMIN — Medication 25 MILLIGRAM(S): at 05:25

## 2018-01-29 RX ADMIN — Medication 25 MILLIGRAM(S): at 18:55

## 2018-01-29 RX ADMIN — Medication 75 MICROGRAM(S): at 05:25

## 2018-01-29 RX ADMIN — HEPARIN SODIUM 5000 UNIT(S): 5000 INJECTION INTRAVENOUS; SUBCUTANEOUS at 18:55

## 2018-01-29 RX ADMIN — Medication 600 MILLIGRAM(S): at 23:13

## 2018-01-29 RX ADMIN — Medication 5 MILLIGRAM(S): at 22:43

## 2018-01-29 RX ADMIN — AMLODIPINE BESYLATE 5 MILLIGRAM(S): 2.5 TABLET ORAL at 05:29

## 2018-01-29 RX ADMIN — Medication 400 MILLIGRAM(S): at 01:58

## 2018-01-29 RX ADMIN — Medication 5 MILLIGRAM(S): at 01:22

## 2018-01-29 RX ADMIN — Medication 10 MILLIGRAM(S): at 00:01

## 2018-01-29 RX ADMIN — Medication 40 MILLIEQUIVALENT(S): at 05:30

## 2018-01-29 RX ADMIN — POLYETHYLENE GLYCOL 3350 17 GRAM(S): 17 POWDER, FOR SOLUTION ORAL at 11:54

## 2018-01-29 NOTE — PROGRESS NOTE ADULT - PROBLEM SELECTOR PLAN 3
Patient with pleuritic chest pain and chest wall tenderness. Likely costrochondritis. Did have EKG changes with TWI in lead III and aVF, resolved on repeat EKG. Trops negative.  - trops now <0.01, pending repeat EKG   -Tylenol PRN for pain Patient with pleuritic chest pain and chest wall tenderness. Likely costochondritis. Did have EKG changes with TWI in lead III and aVF, resolved on repeat EKG. Trops negative.  - trops now <0.01, pending repeat EKG   -Tylenol PRN for pain

## 2018-01-29 NOTE — CONSULT NOTE ADULT - ASSESSMENT
89yo F with HTN, HLD, A. fib not on AC, DVT/PE, hypothyroidism presents s/p fall with CP in the ED now s/p 6 stiches.     Problem/Plan - 1:  ·  Problem: Fall.  Plan: Patient with one year history of falls s/p unsuccessful sx OS and subsequent vision loss.   -seen by PT, recommend ALYSHA - pt agreeing  - TSH nl,   - Vitamin D and RPR pending.     Problem/Plan - 2:  ·  Problem: Laceration of scalp.  Plan: s/p 6 sutures in the ED. Complaining of mild headache.   -Tylenol PRN.     Problem/Plan - 3:  ·  Problem: Chest pain, unspecified, other.  Plan: Patient with pleuritic chest pain and chest wall tenderness. Likely costochondritis. Did have EKG changes with TWI in lead III and aVF, resolved on repeat EKG. Trops negative.  - trops now <0.01, pending repeat EKG   -Tylenol PRN for pain.    Problem/Plan - 4:  ·  Problem: Hypertensive urgency.  Plan: BP 190s/90s at presentation, asymptomatic. Denies high blood pressure on home readings. Now 154/67  - also received Hydralazine 10mg PO x1 on admission  -c/w Metoprolol 25mg BID  -added Norvasc 5mg QD - will go up if necessary.

## 2018-01-29 NOTE — PROGRESS NOTE ADULT - SUBJECTIVE AND OBJECTIVE BOX
INTERVAL HPI/OVERNIGHT EVENTS:  Patient was seen and examined at bedside. As per nurse and patient, no o/n events, patient resting comfortably. No complaints at this time. Patient denies: fever, chills, dizziness, weakness, HA, Changes in vision, CP, palpitations, SOB, cough, N/V/D/C, dysuria, changes in bowel movements, LE edema.    VITAL SIGNS:  T(F): 97.9 (01-29-18 @ 05:11)  HR: 90 (01-29-18 @ 05:11)  BP: 154/67 (01-29-18 @ 05:11)  RR: 18 (01-29-18 @ 05:11)  SpO2: 93% (01-29-18 @ 05:11)  Wt(kg): --    PHYSICAL EXAM:    Constitutional: thin, resting comfortably in bed WF NAD  	Head: bandaged laceration in the occipital, no blood on bandage  	Eyes: L eyelid droop + anisocoria s/p unsuccessful surgery; also unable to adduct OS; impaired vision OS  	ENT: no nasal discharge; uvula midline, no oropharyngeal erythema or exudates; MMM  	Neck: supple; no JVD or thyromegaly  	Respiratory: CTA B/L; no W/R/R,   	Cardiac: +S1/S2; irregularly irregular, no M/R/G; no ttp chest at the time of assessment  	Gastrointestinal: soft, nondistended, mildly tender to deep palpation in the epigastric region; no rebound or guarding; +BSx4  	Back: spine midline, no bony tenderness or step-offs; no CVAT B/L  	Extremities: WWP, no clubbing or cyanosis; no edema  	Musculoskeletal: NROM x4; no joint swelling, tenderness or erythema  	Vascular: 2+ radial, DP/PT pulses B/L  	Dermatologic: skin warm, dry and intact; no rashes, wounds, or scars other than above (head)  	Lymphatic: no submandibular or cervical LAD  	Neurologic: AAOx2.5; CNII-XII grossly intact; no focal deficits, COLBY, strength and sensation grossly intact throughout  Psychiatric: affect and characteristics of appearance, verbalizations, behaviors are appropriate      MEDICATIONS  (STANDING):  amLODIPine   Tablet 5 milliGRAM(s) Oral daily  heparin  Injectable 5000 Unit(s) SubCutaneous every 8 hours  levothyroxine 75 MICROGram(s) Oral daily  melatonin 5 milliGRAM(s) Oral at bedtime  metoprolol     tartrate 25 milliGRAM(s) Oral two times a day  polyethylene glycol 3350 17 Gram(s) Oral daily    MEDICATIONS  (PRN):  acetaminophen   Tablet. 650 milliGRAM(s) Oral every 6 hours PRN Moderate Pain (4 - 6)      Allergies    Alphagan (Unknown)  brimonidine ophthalmic (Unknown)    Intolerances        LABS:                        12.8   8.8   )-----------( 196      ( 28 Jan 2018 14:57 )             38.5     01-29    134<L>  |  95<L>  |  13  ----------------------------<  105<H>  3.6   |  26  |  0.51    Ca    9.2      29 Jan 2018 02:46  Mg     1.8     01-29    TPro  7.1  /  Alb  4.0  /  TBili  0.6  /  DBili  x   /  AST  29  /  ALT  21  /  AlkPhos  57  01-28    PT/INR - ( 28 Jan 2018 14:58 )   PT: 13.1 sec;   INR: 1.18          PTT - ( 28 Jan 2018 14:58 )  PTT:29.1 sec  Urinalysis Basic - ( 28 Jan 2018 15:20 )    Color: Yellow / Appearance: Clear / SG: <=1.005 / pH: x  Gluc: x / Ketone: NEGATIVE  / Bili: Negative / Urobili: 0.2 E.U./dL   Blood: x / Protein: Trace mg/dL / Nitrite: NEGATIVE   Leuk Esterase: NEGATIVE / RBC: 5-10 /HPF / WBC x   Sq Epi: x / Non Sq Epi: Moderate /HPF / Bacteria: x        RADIOLOGY & ADDITIONAL TESTS: INTERVAL HPI/OVERNIGHT EVENTS:  Patient was seen and examined at bedside. As per nurse and patient, no o/n events, patient resting comfortably. Pt still c/o some epigastric discomfort, but no chest pain at the time of assessment. Stated that headache has been improving. Denied nausea/vomiting/acute vision changes/SOB/cough/dysuria/LE edema.    VITAL SIGNS:  Vital Signs Last 24 Hrs  T(C): 36.6 (29 Jan 2018 05:11), Max: 36.8 (28 Jan 2018 23:30)  T(F): 97.9 (29 Jan 2018 05:11), Max: 98.2 (28 Jan 2018 23:30)  HR: 90 (29 Jan 2018 05:11) (63 - 90)  BP: 154/67 (29 Jan 2018 05:11) (154/67 - 199/80)  BP(mean): --  RR: 18 (29 Jan 2018 05:11) (16 - 18)  SpO2: 93% (29 Jan 2018 05:11) (93% - 100%)  PHYSICAL EXAM:    Constitutional: thin, resting comfortably in bed WF NAD  	Head: bandaged laceration in the occipital, no blood on bandage  	Eyes: L eyelid droop + anisocoria s/p unsuccessful surgery; also unable to adduct OS; impaired vision OS  	ENT: no nasal discharge; uvula midline, no oropharyngeal erythema or exudates; MMM  	Neck: supple; no JVD or thyromegaly  	Respiratory: CTA B/L; no W/R/R,   	Cardiac: +S1/S2; irregularly irregular, no M/R/G; no ttp chest at the time of assessment  	Gastrointestinal: soft, nondistended, mildly tender to deep palpation in the epigastric region; no rebound or guarding; +BSx4  	Back: spine midline, no bony tenderness or step-offs; no CVAT B/L  	Extremities: WWP, no clubbing or cyanosis; no edema  	Musculoskeletal: NROM x4; no joint swelling, tenderness or erythema  	Vascular: 2+ radial, DP/PT pulses B/L  	Dermatologic: skin warm, dry and intact; no rashes, wounds, or scars other than above (head)  	Lymphatic: no submandibular or cervical LAD  	Neurologic: AAOx2.5; CNII-XII grossly intact; no focal deficits, COLBY, strength and sensation grossly intact throughout  Psychiatric: no evidence of acute MS changes      MEDICATIONS  (STANDING):  amLODIPine   Tablet 5 milliGRAM(s) Oral daily  heparin  Injectable 5000 Unit(s) SubCutaneous every 8 hours  levothyroxine 75 MICROGram(s) Oral daily  melatonin 5 milliGRAM(s) Oral at bedtime  metoprolol     tartrate 25 milliGRAM(s) Oral two times a day  polyethylene glycol 3350 17 Gram(s) Oral daily    MEDICATIONS  (PRN):  acetaminophen   Tablet. 650 milliGRAM(s) Oral every 6 hours PRN Moderate Pain (4 - 6)      Allergies    Alphagan (Unknown)  brimonidine ophthalmic (Unknown)    Intolerances        LABS:                        12.8   8.8   )-----------( 196      ( 28 Jan 2018 14:57 )             38.5     01-29    134<L>  |  95<L>  |  13  ----------------------------<  105<H>  3.6   |  26  |  0.51    Ca    9.2      29 Jan 2018 02:46  Mg     1.8     01-29    TPro  7.1  /  Alb  4.0  /  TBili  0.6  /  DBili  x   /  AST  29  /  ALT  21  /  AlkPhos  57  01-28    PT/INR - ( 28 Jan 2018 14:58 )   PT: 13.1 sec;   INR: 1.18          PTT - ( 28 Jan 2018 14:58 )  PTT:29.1 sec  Urinalysis Basic - ( 28 Jan 2018 15:20 )    Color: Yellow / Appearance: Clear / SG: <=1.005 / pH: x  Gluc: x / Ketone: NEGATIVE  / Bili: Negative / Urobili: 0.2 E.U./dL   Blood: x / Protein: Trace mg/dL / Nitrite: NEGATIVE   Leuk Esterase: NEGATIVE / RBC: 5-10 /HPF / WBC x   Sq Epi: x / Non Sq Epi: Moderate /HPF / Bacteria: x        RADIOLOGY & ADDITIONAL TESTS:  < from: Xray Chest 1 View AP- PORTABLE-Urgent (01.28.18 @ 21:05) >  No fracture is seen. No consolidation. A dedicated rib series   (radiographs) or CT scan may be of benefit if there is continued concern   for fracture.    < end of copied text >

## 2018-01-29 NOTE — PATIENT PROFILE ADULT. - TEACHING/LEARNING LEARNING PREFERENCES
group instruction/individual instruction/skill demonstration/written material/audio/verbal instruction

## 2018-01-29 NOTE — CONSULT NOTE ADULT - SUBJECTIVE AND OBJECTIVE BOX
Patient is a 89y old  Female who presents with a chief complaint of fall (28 Jan 2018 22:41)       HPI:  89yo F with HTN, HLD, A. fib not on AC, DVT/PE, hypothyroidism presents s/p fall. Patient reports she was standing in her kitchen when she fell backwards and hit her head. She denies LOC or dizziness but cannot really provide history of how she fell. She states that she falls often, about four times per day for the past year. Does not know why this happens, walks with a cane. She goes to physical therapy twice a week. She reports having a cardio workup and was told she is "OK." Additionally, patient reports epigastric pain x 2 months described as a band around her diaphragm Pain is dull, moderate, no radiation, not associated with food. She states its intermittent. Last c-scope 3 years ago, was "normal." While patient was in ER, she developed sudden onset CP which was evaluated in the ED. She states pain is persistent, 5/10, sharp, non radiating and worse on inspiration. Not improved with Tylenol. She reports she occasionally gets this kind of pain, usually associated with heart palpitations Currently, denies fever, chills, N/V/D, dysuria.     In the ED, VS: 97.0 191/83 63 16 100% RA  Given Tylenol 650mg PO, ASA 325mg PO (28 Jan 2018 22:41)      PAST MEDICAL & SURGICAL HISTORY:  PE (pulmonary thromboembolism)  DVT (deep venous thrombosis)  Arrhythmia  Essential hypertension: Hypertension  Hypothyroidism: Hypothyroid  Hyperlipidemia: Hyperlipidemia  Insomnia: Insomnia  S/P appendectomy  H/O knee surgery      MEDICATIONS  (STANDING):  amLODIPine   Tablet 5 milliGRAM(s) Oral daily  heparin  Injectable 5000 Unit(s) SubCutaneous every 12 hours  levothyroxine 75 MICROGram(s) Oral daily  melatonin 5 milliGRAM(s) Oral at bedtime  metoprolol     tartrate 25 milliGRAM(s) Oral two times a day  polyethylene glycol 3350 17 Gram(s) Oral daily    MEDICATIONS  (PRN):  acetaminophen   Tablet. 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    Functional Level Prior to Admission: reports partial assist with bathing walks with a cane/rolling walker    FAMILY HISTORY:  No pertinent family history in first degree relatives      CBC Full  -  ( 28 Jan 2018 14:57 )  WBC Count : 8.8 K/uL  Hemoglobin : 12.8 g/dL  Hematocrit : 38.5 %  Platelet Count - Automated : 196 K/uL  Mean Cell Volume : 99.5 fL  Mean Cell Hemoglobin : 33.1 pg  Mean Cell Hemoglobin Concentration : 33.2 g/dL  Auto Neutrophil # : x  Auto Lymphocyte # : x  Auto Monocyte # : x  Auto Eosinophil # : x  Auto Basophil # : x  Auto Neutrophil % : 78.5 %  Auto Lymphocyte % : 11.0 %  Auto Monocyte % : 9.8 %  Auto Eosinophil % : 0.5 %  Auto Basophil % : 0.2 %      01-29    134<L>  |  95<L>  |  13  ----------------------------<  105<H>  3.6   |  26  |  0.51    Ca    9.2      29 Jan 2018 02:46  Mg     1.8     01-29    TPro  7.1  /  Alb  4.0  /  TBili  0.6  /  DBili  x   /  AST  29  /  ALT  21  /  AlkPhos  57  01-28      Urinalysis Basic - ( 28 Jan 2018 15:20 )    Color: Yellow / Appearance: Clear / SG: <=1.005 / pH: x  Gluc: x / Ketone: NEGATIVE  / Bili: Negative / Urobili: 0.2 E.U./dL   Blood: x / Protein: Trace mg/dL / Nitrite: NEGATIVE   Leuk Esterase: NEGATIVE / RBC: 5-10 /HPF / WBC x   Sq Epi: x / Non Sq Epi: Moderate /HPF / Bacteria: x          Radiology:    < from: Xray Pelvis 3 Views (01.28.18 @ 15:57) >  EXAM:  XR PELVIS-MIN 3 VIEWS                          PROCEDURE DATE:  01/28/2018                     INTERPRETATION:  X-rays of the PELVIS dated 1/28/2018 3:57 PM    Indication: Pain after falling    Comparison studies: Pelvic x-ray dated 8/14/2014.    An AP view of the pelvis demonstrate generalized osteopenia. Severe   degenerative changes are noted in the lower lumbosacral spine.   Heterotopic soft tissue ossification is noted adjacent to the ischial   tuberosity bilaterally. Mild degenerative changes are seen in both hips.       IMPRESSION:  Generalized osteopenia.        < from: Xray Chest 1 View AP- PORTABLE-Urgent (01.28.18 @ 21:05) >  EXAM:  XR CHEST PORTABLE URGENT 1V                          PROCEDURE DATE:  01/28/2018                     INTERPRETATION:  Portable AP Chest Radiograph dated 1/28/2018 9:05 PM    CLINICAL INFORMATION: 89 years, Female, left rib pain    PRIOR STUDIES: Chest x-ray 12/30/2017    FINDINGS:   Lines, Catheters and Support Devices: None.    Heart Size, Mediastinum and Hilar Contours: The cardiomediastinal   silhouette is within normal limits.      Lungs: Reticular opacities in the bilateral lungs, predominantly in the   lung bases, may represent fibrosis versus scar.. No pleural effusions. No   pneumothorax.     Bones/Soft Tissues: No acute abnormalities of the soft tissues and   osseous structures is seen. There is S-shaped scoliosis of the   thoracolumbar spine. Soft tissue opacities superior to the right humeral   head are most consistent with calcific tendinitis. Severe chronic   pathology of the left glenohumeral joint is partially visualized.      IMPRESSION:  No fracture is seen. No consolidation. A dedicated rib series   (radiographs) or CT scan may be of benefit if there is continued concern   for fracture.          < from: CT Head No Cont (01.28.18 @ 15:43) >  EXAM:  CT BRAIN                          PROCEDURE DATE:  01/28/2018                     INTERPRETATION:  PROCEDURE: CT head without intravenous contrast    INDICATION: Head injury status post fall.    TECHNIQUE: Multiple axial images were obtainedat 5 mm intervals from the   skull base to the vertex. The images were reviewed in brain and bone   windows.    COMPARISON: Prior study dated 12/30/2017.    FINDINGS: The CT examination demonstrates age-appropriate volume loss.   Periventricular white matter hypodensities noted consistent with   microvascular ischemic white matter disease. There is no midline shift or   extra axial collections. The gray white differentiation appears within   normal limits. There is no intracranial hemorrhage or acute transcortical   infarct. The bony windows demonstrates no fractures. The visualized   paranasal sinuses are within normal limits. The mastoid air cells are   well aerated. Prosthesis noted about the left orbital globe. Status post   bilateral cataract surgery. Significant subcutaneous soft tissue   swelling/hematoma formation and laceration noted in the occipital region.    IMPRESSION: No acute intracranial findings. Significant subcutaneous soft   tissue swelling/hematoma formation and laceration in the occipital region.              Vital Signs Last 24 Hrs  T(C): 36.6 (29 Jan 2018 12:02), Max: 36.8 (28 Jan 2018 23:30)  T(F): 97.9 (29 Jan 2018 12:02), Max: 98.2 (28 Jan 2018 23:30)  HR: 72 (29 Jan 2018 12:02) (64 - 90)  BP: 146/72 (29 Jan 2018 12:02) (146/72 - 199/80)  BP(mean): --  RR: 18 (29 Jan 2018 12:02) (16 - 18)  SpO2: 94% (29 Jan 2018 12:02) (93% - 100%)    REVIEW OF SYSTEMS:    CONSTITUTIONAL: fatigue  EYES: No eye pain, visual disturbances, or discharge  ENMT:  No difficulty hearing, tinnitus, vertigo; No sinus or throat pain  NECK: No pain or stiffness  BREASTS: No pain, masses, or nipple discharge  RESPIRATORY: No cough, wheezing, chills or hemoptysis; No shortness of breath  CARDIOVASCULAR: No chest pain, palpitations, dizziness, or leg swelling  GASTROINTESTINAL: No abdominal or epigastric pain. No nausea, vomiting, or hematemesis; No diarrhea or constipation. No melena or hematochezia.  GENITOURINARY: No dysuria, frequency, hematuria, or incontinence  NEUROLOGICAL: No headaches, memory loss, loss of strength, numbness, or tremors  SKIN: No itching, burning, rashes, or lesions   LYMPH NODES: No enlarged glands  ENDOCRINE: No heat or cold intolerance; No hair loss  MUSCULOSKELETAL: No joint pain or swelling; No muscle, back, or extremity pain  PSYCHIATRIC: No depression, anxiety, mood swings, or difficulty sleeping  HEME/LYMPH: No easy bruising, or bleeding gums  ALLERGY AND IMMUNOLOGIC: No hives or eczema      Physical Exam: frail elderly appearing  (Thai ) woman lying in semi Fowlers position, c/o fatigue    HEENT: normocephalic,  anicteric, occipital soft tissue swelling + lac with 6 sutures    Neck: supple, negative JVD, negative carotid bruits,    Chest: CTA bilaterally, neg wheeze, rhonchi, rales, crackles, egophany    Cardiovascular: irregular rate and rhythm, neg murmurs/rubs/gallops    Abdomen: soft, non distended, non tender, negative rebound/guarding, normal bowel sounds, neg hepatosplenomegaly    Extremities: WWP, neg cyanosis/clubbing/edema, negative calf tenderness to palpation, negative Mitch's sign    :     Neurologic Exam:    Alert and oriented x 2  to person, place, 2014 date/year, speech fluent w/o dysarthria, repetition intact, comprehension intact,     Cranial Nerves:     II:                       pos anisocoria, visual fields intact   III/ IV/VI:             extraocular movements intact, neg nystagmus, ptosis  V:                      facial sensation intact, V1-3 normal  VII:                     face symmetric, no droop, normal eye closure and smile  VIII:                    hearing intact to finger rub bilaterally  IX/ X:                  soft palate rise symmetrical  XI:                      head turning, shoulder shrug normal  XII:                     tongue midline    Motor Exam:    Bilateral UE:         4/5 /intrinsics                            5/5 biceps/triceps/wrist extensors-flexors/deltoid                            negative pronator drift      Bilateral LE:         4/5 hip flexors/adductors/abductors                            4+/5 quadriceps/hamstrings                            5/5 dorsiflexors/plantar flexors/invertors-evertors    Sensory:             intact to LT/PP in all UE/LE dermatomes    DTR:                   = biceps/     triceps/     brachioradialis                            = patella/   medial hamstring/    ankle                            neg clonus                            neg Babinski                            neg Hoffmans    Finger to Nose: wnl    Heel to Shin:      wnl    Rapid Alternating movements:  wnl    Joint Position Sense:  intact    Romberg: NT    Tandem Walking: NT    Gait:  NT        PM&R Impression:    1) s/p fall  2) occipital laceration with sutures  3) deconditioned  4) no focal weakness      Recommendations:    1) Physical therapy focusing on therapeutic exercises, bed mobility/transfer out of bed evaluation, progressive ambulation with assistive devices.    2) Disposition Plan: subacute rehab placement

## 2018-01-29 NOTE — PROGRESS NOTE ADULT - PROBLEM SELECTOR PLAN 6
Patient reports history of atrial fibrillation, has been on Coumadin in the past but was told by her physician to stop the medication. EKG currently shows sinus arrhythmia.  -obtain collateral from Dr. Prieto Patient reports history of atrial fibrillation, has been on Coumadin in the past but was told by her physician to stop the medication. EKG currently shows sinus arrhythmia.  -obtain collateral from Dr. Prieto - left a message and contact info

## 2018-01-30 DIAGNOSIS — E55.9 VITAMIN D DEFICIENCY, UNSPECIFIED: ICD-10-CM

## 2018-01-30 DIAGNOSIS — I48.0 PAROXYSMAL ATRIAL FIBRILLATION: ICD-10-CM

## 2018-01-30 DIAGNOSIS — E03.9 HYPOTHYROIDISM, UNSPECIFIED: ICD-10-CM

## 2018-01-30 DIAGNOSIS — S01.01XA LACERATION WITHOUT FOREIGN BODY OF SCALP, INITIAL ENCOUNTER: ICD-10-CM

## 2018-01-30 DIAGNOSIS — R53.81 OTHER MALAISE: ICD-10-CM

## 2018-01-30 LAB
ANION GAP SERPL CALC-SCNC: 12 MMOL/L — SIGNIFICANT CHANGE UP (ref 5–17)
BUN SERPL-MCNC: 13 MG/DL — SIGNIFICANT CHANGE UP (ref 7–23)
CALCIUM SERPL-MCNC: 8.8 MG/DL — SIGNIFICANT CHANGE UP (ref 8.4–10.5)
CHLORIDE SERPL-SCNC: 98 MMOL/L — SIGNIFICANT CHANGE UP (ref 96–108)
CO2 SERPL-SCNC: 25 MMOL/L — SIGNIFICANT CHANGE UP (ref 22–31)
CREAT SERPL-MCNC: 0.51 MG/DL — SIGNIFICANT CHANGE UP (ref 0.5–1.3)
GLUCOSE SERPL-MCNC: 124 MG/DL — HIGH (ref 70–99)
HCT VFR BLD CALC: 34.1 % — LOW (ref 34.5–45)
HGB BLD-MCNC: 11.2 G/DL — LOW (ref 11.5–15.5)
MAGNESIUM SERPL-MCNC: 1.8 MG/DL — SIGNIFICANT CHANGE UP (ref 1.6–2.6)
MCHC RBC-ENTMCNC: 32.6 PG — SIGNIFICANT CHANGE UP (ref 27–34)
MCHC RBC-ENTMCNC: 32.8 G/DL — SIGNIFICANT CHANGE UP (ref 32–36)
MCV RBC AUTO: 99.1 FL — SIGNIFICANT CHANGE UP (ref 80–100)
PLATELET # BLD AUTO: 180 K/UL — SIGNIFICANT CHANGE UP (ref 150–400)
POTASSIUM SERPL-MCNC: 3.8 MMOL/L — SIGNIFICANT CHANGE UP (ref 3.5–5.3)
POTASSIUM SERPL-SCNC: 3.8 MMOL/L — SIGNIFICANT CHANGE UP (ref 3.5–5.3)
RBC # BLD: 3.44 M/UL — LOW (ref 3.8–5.2)
RBC # FLD: 15.1 % — SIGNIFICANT CHANGE UP (ref 10.3–16.9)
SODIUM SERPL-SCNC: 135 MMOL/L — SIGNIFICANT CHANGE UP (ref 135–145)
WBC # BLD: 8.9 K/UL — SIGNIFICANT CHANGE UP (ref 3.8–10.5)
WBC # FLD AUTO: 8.9 K/UL — SIGNIFICANT CHANGE UP (ref 3.8–10.5)

## 2018-01-30 PROCEDURE — 99232 SBSQ HOSP IP/OBS MODERATE 35: CPT

## 2018-01-30 RX ORDER — LIDOCAINE 4 G/100G
1 CREAM TOPICAL DAILY
Qty: 0 | Refills: 0 | Status: DISCONTINUED | OUTPATIENT
Start: 2018-01-30 | End: 2018-01-31

## 2018-01-30 RX ADMIN — AMLODIPINE BESYLATE 5 MILLIGRAM(S): 2.5 TABLET ORAL at 06:39

## 2018-01-30 RX ADMIN — LIDOCAINE 1 PATCH: 4 CREAM TOPICAL at 11:15

## 2018-01-30 RX ADMIN — LIDOCAINE 1 PATCH: 4 CREAM TOPICAL at 23:35

## 2018-01-30 RX ADMIN — Medication 1 TABLET(S): at 17:19

## 2018-01-30 RX ADMIN — HEPARIN SODIUM 5000 UNIT(S): 5000 INJECTION INTRAVENOUS; SUBCUTANEOUS at 06:40

## 2018-01-30 RX ADMIN — HEPARIN SODIUM 5000 UNIT(S): 5000 INJECTION INTRAVENOUS; SUBCUTANEOUS at 17:19

## 2018-01-30 RX ADMIN — Medication 25 MILLIGRAM(S): at 17:19

## 2018-01-30 RX ADMIN — Medication 75 MICROGRAM(S): at 06:39

## 2018-01-30 RX ADMIN — Medication 5 MILLIGRAM(S): at 21:28

## 2018-01-30 RX ADMIN — Medication 25 MILLIGRAM(S): at 06:39

## 2018-01-30 RX ADMIN — POLYETHYLENE GLYCOL 3350 17 GRAM(S): 17 POWDER, FOR SOLUTION ORAL at 11:15

## 2018-01-30 NOTE — PROGRESS NOTE ADULT - SUBJECTIVE AND OBJECTIVE BOX
INTERVAL HPI/OVERNIGHT EVENTS:  Patient was seen and examined at bedside. Still some pain at the site of head injury + minimal amount of blood. No other neuro changes/concerns. Some discomfort at buttocks. Patient denies: fever, chills, weakness, Changes in vision, CP, palpitations, SOB, cough, N/V/D/C, dysuria, changes in bowel movements, LE edema.    VITAL SIGNS:  Vital Signs Last 24 Hrs  T(C): 36.9 (30 Jan 2018 08:39), Max: 36.9 (29 Jan 2018 21:21)  T(F): 98.5 (30 Jan 2018 08:39), Max: 98.5 (30 Jan 2018 08:39)  HR: 75 (30 Jan 2018 08:39) (75 - 85)  BP: 167/97 (30 Jan 2018 08:39) (118/69 - 167/97)  BP(mean): --  RR: 18 (30 Jan 2018 08:39) (18 - 20)  SpO2: 95% (30 Jan 2018 08:39) (94% - 95%)    PHYSICAL EXAM:    Constitutional: thin, resting comfortably in bed WF NAD  	Head: minimal amount of blood at the site of inkury  	Eyes: L eyelid droop + anisocoria s/p unsuccessful surgery; also unable to adduct OS; impaired vision OS  	ENT: no nasal discharge; uvula midline, no oropharyngeal erythema or exudates; MMM  	Neck: supple; no JVD or thyromegaly  	Respiratory: CTA B/L; no W/R/R,   	Cardiac: +S1/S2; irregularly irregular, no M/R/G; no ttp chest at the time of assessment  	Gastrointestinal: soft, nondistended, mildly tender to deep palpation in the epigastric region; no rebound or guarding; +BSx4  	Back: spine midline, no bony tenderness or step-offs; no CVAT B/L  	Extremities: WWP, no clubbing or cyanosis; no edema  	Musculoskeletal: NROM x4; no joint swelling, tenderness or erythema; no pain on leg elevation or point tenderness  	Vascular: 2+ radial, DP/PT pulses B/L  	Dermatologic: skin warm, dry and intact; no rashes, wounds, or scars other than above (head)  	Lymphatic: no submandibular or cervical LAD  	Neurologic: AAOx2.5; CNII-XII grossly intact; no focal deficits, COLBY, strength and sensation grossly intact throughout  Psychiatric: no evidence of acute MS changes      MEDICATIONS  (STANDING):  amLODIPine   Tablet 5 milliGRAM(s) Oral daily  calcium carbonate  625 mG + Vitamin D (OsCal 250 + D) 1 Tablet(s) Oral daily  heparin  Injectable 5000 Unit(s) SubCutaneous every 12 hours  levothyroxine 75 MICROGram(s) Oral daily  lidocaine   Patch 1 Patch Transdermal daily  melatonin 5 milliGRAM(s) Oral at bedtime  metoprolol     tartrate 25 milliGRAM(s) Oral two times a day  polyethylene glycol 3350 17 Gram(s) Oral daily    MEDICATIONS  (PRN):  acetaminophen   Tablet. 650 milliGRAM(s) Oral every 6 hours PRN Moderate Pain (4 - 6)      Allergies    Alphagan (Unknown)  brimonidine ophthalmic (Unknown)    Intolerances        LABS:                        11.2   8.9   )-----------( 180      ( 30 Jan 2018 06:16 )             34.1     01-30    135  |  98  |  13  ----------------------------<  124<H>  3.8   |  25  |  0.51    Ca    8.8      30 Jan 2018 06:16  Mg     1.8     01-30    TPro  7.1  /  Alb  4.0  /  TBili  0.6  /  DBili  x   /  AST  29  /  ALT  21  /  AlkPhos  57  01-28    PT/INR - ( 28 Jan 2018 14:58 )   PT: 13.1 sec;   INR: 1.18          PTT - ( 28 Jan 2018 14:58 )  PTT:29.1 sec  Urinalysis Basic - ( 28 Jan 2018 15:20 )    Color: Yellow / Appearance: Clear / SG: <=1.005 / pH: x  Gluc: x / Ketone: NEGATIVE  / Bili: Negative / Urobili: 0.2 E.U./dL   Blood: x / Protein: Trace mg/dL / Nitrite: NEGATIVE   Leuk Esterase: NEGATIVE / RBC: 5-10 /HPF / WBC x   Sq Epi: x / Non Sq Epi: Moderate /HPF / Bacteria: x        RADIOLOGY & ADDITIONAL TESTS:

## 2018-01-30 NOTE — PROGRESS NOTE ADULT - PROBLEM SELECTOR PLAN 6
Patient reports history of atrial fibrillation, has been on Coumadin in the past but was told by her physician to stop the medication. EKG currently shows sinus arrhythmia.  - no a/c 2/2 frequent falls

## 2018-01-30 NOTE — PROGRESS NOTE ADULT - SUBJECTIVE AND OBJECTIVE BOX
Physical Medicine and Rehabilitation Progress Note:    Patient is a 89y old  Female who presents with a chief complaint of fall (28 Jan 2018 22:41)      HPI:  89yo F with HTN, HLD, A. fib not on AC, DVT/PE, hypothyroidism presents s/p fall. Patient reports she was standing in her kitchen when she fell backwards and hit her head. She denies LOC or dizziness but cannot really provide history of how she fell. She states that she falls often, about four times per day for the past year. Does not know why this happens, walks with a cane. She goes to physical therapy twice a week. She reports having a cardio workup and was told she is "OK." Additionally, patient reports epigastric pain x 2 months described as a band around her diaphragm Pain is dull, moderate, no radiation, not associated with food. She states its intermittent. Last c-scope 3 years ago, was "normal." While patient was in ER, she developed sudden onset CP which was evaluated in the ED. She states pain is persistent, 5/10, sharp, non radiating and worse on inspiration. Not improved with Tylenol. She reports she occasionally gets this kind of pain, usually associated with heart palpitations Currently, denies fever, chills, N/V/D, dysuria.     In the ED, VS: 97.0 191/83 63 16 100% RA  Given Tylenol 650mg PO, ASA 325mg PO (28 Jan 2018 22:41)                            11.2   8.9   )-----------( 180      ( 30 Jan 2018 06:16 )             34.1       01-30    135  |  98  |  13  ----------------------------<  124<H>  3.8   |  25  |  0.51    Ca    8.8      30 Jan 2018 06:16  Mg     1.8     01-30    TPro  7.1  /  Alb  4.0  /  TBili  0.6  /  DBili  x   /  AST  29  /  ALT  21  /  AlkPhos  57  01-28    Vital Signs Last 24 Hrs  T(C): 36.9 (30 Jan 2018 08:39), Max: 36.9 (29 Jan 2018 21:21)  T(F): 98.5 (30 Jan 2018 08:39), Max: 98.5 (30 Jan 2018 08:39)  HR: 75 (30 Jan 2018 08:39) (75 - 85)  BP: 167/97 (30 Jan 2018 08:39) (118/69 - 167/97)  BP(mean): --  RR: 18 (30 Jan 2018 08:39) (18 - 20)  SpO2: 95% (30 Jan 2018 08:39) (94% - 95%)    MEDICATIONS  (STANDING):  amLODIPine   Tablet 5 milliGRAM(s) Oral daily  calcium carbonate  625 mG + Vitamin D (OsCal 250 + D) 1 Tablet(s) Oral daily  heparin  Injectable 5000 Unit(s) SubCutaneous every 12 hours  levothyroxine 75 MICROGram(s) Oral daily  lidocaine   Patch 1 Patch Transdermal daily  melatonin 5 milliGRAM(s) Oral at bedtime  metoprolol     tartrate 25 milliGRAM(s) Oral two times a day  polyethylene glycol 3350 17 Gram(s) Oral daily    MEDICATIONS  (PRN):  acetaminophen   Tablet. 650 milliGRAM(s) Oral every 6 hours PRN Moderate Pain (4 - 6)    Currently Undergoing Physical Therapy at bedside.    Initial Functional Status Assessment:    Previous Level of Function:     · Ambulation Skills  independent; needs device; straight cane primarily however has rolling walker    · Transfer Skills  independent    · ADL Skills  needed assist; needs assistance with showering, however reports being able to toilet independently    · Work/Leisure Activity  needed assist      Cognitive Status Examination:   · Orientation  person; place; situation    · Level of Consciousness  alert    · Follows Commands and Answers Questions  100% of the time    · Personal Safety and Judgment  intact    · Short Term Memory  intact    · Long Term Memory  intact      Range of Motion Exam:   · Range of Motion Examination  no ROM deficits were identified    · Active Range of Motion Examination  no Active ROM deficits were identified      Manual Muscle Testing:   · Manual Muscle Testing Results  grossly assessed due to  functional observation against gravity > 3/5 MMT      Bed Mobility: Rolling/Turning:     · Level of [place]  minimum assist (75% patients effort)      Bed Mobility: Scooting/Bridging:     · Level of [place]  supervision      Bed Mobility: Sit to Supine:     · Level of [place]  minimum assist (75% patients effort)      Bed Mobility: Supine to Sit:     · Level of [place]  minimum assist (75% patients effort)      Transfer: Sit to Stand:     · Level of [place]  minimum assist (75% patients effort)    · Assistive Device  rolling walker      Transfer: Stand to Sit:     · Level of [place]  minimum assist (75% patients effort)    · Physical Assist/Nonphysical Assist  1 person assist    · Assistive Device  rolling walker      Gait Skills:     · Level of [place]  minimum assist (75% patients effort)    · Physical Assist/Nonphysical Assist  1 person assist    · Assistive Device  rolling walker    · Gait Distance  75 feet      Gait Analysis:     · Gait Deviations Noted  decreased alfredito; decreased step length; decreased weight-shifting ability; flexed forward posture    · Impairments Contributing to Gait Deviations  impaired balance; decreased strength; pain; pain R hip 8/10            Balance Skills Assessment:     · Sitting Balance: Static  good balance    · Sitting Balance: Dynamic  good balance    · Sit-to-Stand Balance  poor plus    · Standing Balance: Static  fair minus    · Standing Balance: Dynamic  poor plus    · Systems Impairment Contributing to Balance Disturbance  musculoskeletal    · Identified Impairments Contributing to Balance Disturbance  decreased strength      Clinical Impressions:   · Criteria for Skilled Therapeutic Interventions  impairments found; functional limitations in following categories; risk reduction/prevention; rehab potential; therapy frequency; anticipated discharge recommendation    · Impairments Found (describe specific impairments)  arousal, attention, and cognition; gait, locomotion, and balance; muscle strength    · Functional Limitations in Following Categories (describe specific limitations)  self-care; home management; community/leisure    · Risk Reduction/Prevention (Describe Specific Areas of risk reduction/prevention)  risk factors    · Risk Areas  fall    · Rehab Potential  good, to achieve stated therapy goals    · Therapy Frequency  2-3x/week        PM&R Impression: as above    Disposition Plan Recommendations: subacute rehab placement

## 2018-01-30 NOTE — PROGRESS NOTE ADULT - SUBJECTIVE AND OBJECTIVE BOX
Patient is a 89y old  Female who presents with a chief complaint of fall (28 Jan 2018 22:41)      INTERVAL HPI/OVERNIGHT EVENTS:    Pt. seen and examined at 10:30AM  Pt. reports mild HA at site of sutures; otherwise, no complaints  Denies CP, SOB    Review of Systems: 12 point review of systems otherwise negative    MEDICATIONS  (STANDING):  amLODIPine   Tablet 5 milliGRAM(s) Oral daily  calcium carbonate  625 mG + Vitamin D (OsCal 250 + D) 1 Tablet(s) Oral daily  heparin  Injectable 5000 Unit(s) SubCutaneous every 12 hours  levothyroxine 75 MICROGram(s) Oral daily  lidocaine   Patch 1 Patch Transdermal daily  melatonin 5 milliGRAM(s) Oral at bedtime  metoprolol     tartrate 25 milliGRAM(s) Oral two times a day  polyethylene glycol 3350 17 Gram(s) Oral daily    MEDICATIONS  (PRN):  acetaminophen   Tablet. 650 milliGRAM(s) Oral every 6 hours PRN Moderate Pain (4 - 6)      Allergies    Alphagan (Unknown)  brimonidine ophthalmic (Unknown)    Intolerances          Vital Signs Last 24 Hrs  T(C): 36.9 (30 Jan 2018 08:39), Max: 36.9 (29 Jan 2018 21:21)  T(F): 98.5 (30 Jan 2018 08:39), Max: 98.5 (30 Jan 2018 08:39)  HR: 75 (30 Jan 2018 08:39) (75 - 85)  BP: 167/97 (30 Jan 2018 08:39) (118/69 - 167/97)  BP(mean): --  RR: 18 (30 Jan 2018 08:39) (18 - 20)  SpO2: 95% (30 Jan 2018 08:39) (94% - 95%)  CAPILLARY BLOOD GLUCOSE            Physical Exam:  (at 10:30AM)  Daily     Daily   General:  comfortable-appearing sitting in a chair in NAD  Skin:  WWP  Neuro:  AAOx3, non-focal    LABS:                        11.2   8.9   )-----------( 180      ( 30 Jan 2018 06:16 )             34.1     01-30    135  |  98  |  13  ----------------------------<  124<H>  3.8   |  25  |  0.51    Ca    8.8      30 Jan 2018 06:16  Mg     1.8     01-30    TPro  7.1  /  Alb  4.0  /  TBili  0.6  /  DBili  x   /  AST  29  /  ALT  21  /  AlkPhos  57  01-28    PT/INR - ( 28 Jan 2018 14:58 )   PT: 13.1 sec;   INR: 1.18          PTT - ( 28 Jan 2018 14:58 )  PTT:29.1 sec  Urinalysis Basic - ( 28 Jan 2018 15:20 )    Color: Yellow / Appearance: Clear / SG: <=1.005 / pH: x  Gluc: x / Ketone: NEGATIVE  / Bili: Negative / Urobili: 0.2 E.U./dL   Blood: x / Protein: Trace mg/dL / Nitrite: NEGATIVE   Leuk Esterase: NEGATIVE / RBC: 5-10 /HPF / WBC x   Sq Epi: x / Non Sq Epi: Moderate /HPF / Bacteria: x          RADIOLOGY & ADDITIONAL TESTS:    ---------------------------------------------------------------------------  I personally reviewed: [  ]EKG   [  ]CXR    [  ] CT    [  ]Other  ---------------------------------------------------------------------------  PLEASE CHECK WHEN PRESENT:     [  ]Heart Failure     [  ] Acute     [  ] Acute on Chronic     [  ] Chronic  -------------------------------------------------------------------     [  ]Diastolic [HFpEF]     [  ]Systolic [HFrEF]     [  ]Combined [HFpEF & HFrEF]     [  ]Other:  -------------------------------------------------------------------  [  ]BELEN     [  ]ATN     [  ]Reneal Medullary Necrosis     [  ]Renal Cortical Necrosis     [  ]Other Pathological Lesions:    [  ]CKD 1  [  ]CKD 2  [  ]CKD 3  [  ]CKD 4  [  ]CKD 5  [  ]Other  -------------------------------------------------------------------  [  ]Other/Unspecified:    --------------------------------------------------------------------    Abdominal Nutritional Status  [  ]Malnutrition: See Nutrition Note  [  ]Cachexia  [  ]Other:   [  ]Supplement Ordered:  [  ]Morbid Obesity (BMI >=40]

## 2018-01-30 NOTE — PROGRESS NOTE ADULT - PROBLEM SELECTOR PLAN 3
no complaints on 1/30  Patient with pleuritic chest pain and chest wall tenderness. Likely costochondritis. Did have EKG changes with TWI in lead III and aVF, resolved on repeat EKG. Trops negative.  - trops now <0.01, pending repeat EKG   -Tylenol PRN for pain

## 2018-01-31 ENCOUNTER — TRANSCRIPTION ENCOUNTER (OUTPATIENT)
Age: 83
End: 2018-01-31

## 2018-01-31 VITALS
RESPIRATION RATE: 16 BRPM | SYSTOLIC BLOOD PRESSURE: 139 MMHG | HEART RATE: 84 BPM | OXYGEN SATURATION: 98 % | TEMPERATURE: 98 F | DIASTOLIC BLOOD PRESSURE: 75 MMHG

## 2018-01-31 DIAGNOSIS — K59.00 CONSTIPATION, UNSPECIFIED: ICD-10-CM

## 2018-01-31 LAB
ANION GAP SERPL CALC-SCNC: 10 MMOL/L — SIGNIFICANT CHANGE UP (ref 5–17)
BUN SERPL-MCNC: 13 MG/DL — SIGNIFICANT CHANGE UP (ref 7–23)
CALCIUM SERPL-MCNC: 9.1 MG/DL — SIGNIFICANT CHANGE UP (ref 8.4–10.5)
CHLORIDE SERPL-SCNC: 97 MMOL/L — SIGNIFICANT CHANGE UP (ref 96–108)
CO2 SERPL-SCNC: 29 MMOL/L — SIGNIFICANT CHANGE UP (ref 22–31)
CREAT SERPL-MCNC: 0.55 MG/DL — SIGNIFICANT CHANGE UP (ref 0.5–1.3)
GLUCOSE SERPL-MCNC: 116 MG/DL — HIGH (ref 70–99)
HCT VFR BLD CALC: 36.2 % — SIGNIFICANT CHANGE UP (ref 34.5–45)
HGB BLD-MCNC: 11.4 G/DL — LOW (ref 11.5–15.5)
MAGNESIUM SERPL-MCNC: 1.8 MG/DL — SIGNIFICANT CHANGE UP (ref 1.6–2.6)
MCHC RBC-ENTMCNC: 31.5 G/DL — LOW (ref 32–36)
MCHC RBC-ENTMCNC: 32.5 PG — SIGNIFICANT CHANGE UP (ref 27–34)
MCV RBC AUTO: 103.1 FL — HIGH (ref 80–100)
PLATELET # BLD AUTO: 179 K/UL — SIGNIFICANT CHANGE UP (ref 150–400)
POTASSIUM SERPL-MCNC: 4.5 MMOL/L — SIGNIFICANT CHANGE UP (ref 3.5–5.3)
POTASSIUM SERPL-SCNC: 4.5 MMOL/L — SIGNIFICANT CHANGE UP (ref 3.5–5.3)
RBC # BLD: 3.51 M/UL — LOW (ref 3.8–5.2)
RBC # FLD: 15.1 % — SIGNIFICANT CHANGE UP (ref 10.3–16.9)
SODIUM SERPL-SCNC: 136 MMOL/L — SIGNIFICANT CHANGE UP (ref 135–145)
WBC # BLD: 7.8 K/UL — SIGNIFICANT CHANGE UP (ref 3.8–10.5)
WBC # FLD AUTO: 7.8 K/UL — SIGNIFICANT CHANGE UP (ref 3.8–10.5)

## 2018-01-31 PROCEDURE — 82607 VITAMIN B-12: CPT

## 2018-01-31 PROCEDURE — 85027 COMPLETE CBC AUTOMATED: CPT

## 2018-01-31 PROCEDURE — 72190 X-RAY EXAM OF PELVIS: CPT

## 2018-01-31 PROCEDURE — 81001 URINALYSIS AUTO W/SCOPE: CPT

## 2018-01-31 PROCEDURE — 83735 ASSAY OF MAGNESIUM: CPT

## 2018-01-31 PROCEDURE — 82553 CREATINE MB FRACTION: CPT

## 2018-01-31 PROCEDURE — 97116 GAIT TRAINING THERAPY: CPT

## 2018-01-31 PROCEDURE — 70450 CT HEAD/BRAIN W/O DYE: CPT

## 2018-01-31 PROCEDURE — 36415 COLL VENOUS BLD VENIPUNCTURE: CPT

## 2018-01-31 PROCEDURE — 93005 ELECTROCARDIOGRAM TRACING: CPT | Mod: 76

## 2018-01-31 PROCEDURE — 82550 ASSAY OF CK (CPK): CPT

## 2018-01-31 PROCEDURE — 84443 ASSAY THYROID STIM HORMONE: CPT

## 2018-01-31 PROCEDURE — 80053 COMPREHEN METABOLIC PANEL: CPT

## 2018-01-31 PROCEDURE — 85025 COMPLETE CBC W/AUTO DIFF WBC: CPT

## 2018-01-31 PROCEDURE — 83690 ASSAY OF LIPASE: CPT

## 2018-01-31 PROCEDURE — 85610 PROTHROMBIN TIME: CPT

## 2018-01-31 PROCEDURE — 85730 THROMBOPLASTIN TIME PARTIAL: CPT

## 2018-01-31 PROCEDURE — 12001 RPR S/N/AX/GEN/TRNK 2.5CM/<: CPT

## 2018-01-31 PROCEDURE — 84484 ASSAY OF TROPONIN QUANT: CPT

## 2018-01-31 PROCEDURE — 86780 TREPONEMA PALLIDUM: CPT

## 2018-01-31 PROCEDURE — 99239 HOSP IP/OBS DSCHRG MGMT >30: CPT

## 2018-01-31 PROCEDURE — 97161 PT EVAL LOW COMPLEX 20 MIN: CPT

## 2018-01-31 PROCEDURE — 99285 EMERGENCY DEPT VISIT HI MDM: CPT | Mod: 25

## 2018-01-31 PROCEDURE — 71045 X-RAY EXAM CHEST 1 VIEW: CPT

## 2018-01-31 PROCEDURE — 82306 VITAMIN D 25 HYDROXY: CPT

## 2018-01-31 PROCEDURE — 80048 BASIC METABOLIC PNL TOTAL CA: CPT

## 2018-01-31 RX ORDER — ACETAMINOPHEN 500 MG
2 TABLET ORAL
Qty: 0 | Refills: 0 | DISCHARGE
Start: 2018-01-31

## 2018-01-31 RX ORDER — METOPROLOL TARTRATE 50 MG
1 TABLET ORAL
Qty: 0 | Refills: 0 | COMMUNITY

## 2018-01-31 RX ORDER — LANOLIN ALCOHOL/MO/W.PET/CERES
1 CREAM (GRAM) TOPICAL
Qty: 0 | Refills: 0 | DISCHARGE
Start: 2018-01-31

## 2018-01-31 RX ORDER — LIDOCAINE 4 G/100G
1 CREAM TOPICAL
Qty: 0 | Refills: 0 | DISCHARGE
Start: 2018-01-31

## 2018-01-31 RX ORDER — AMLODIPINE BESYLATE 2.5 MG/1
1 TABLET ORAL
Qty: 0 | Refills: 0 | DISCHARGE
Start: 2018-01-31

## 2018-01-31 RX ORDER — METOPROLOL TARTRATE 50 MG
1 TABLET ORAL
Qty: 0 | Refills: 0 | DISCHARGE
Start: 2018-01-31

## 2018-01-31 RX ORDER — POLYETHYLENE GLYCOL 3350 17 G/17G
17 POWDER, FOR SOLUTION ORAL
Qty: 0 | Refills: 0 | DISCHARGE
Start: 2018-01-31

## 2018-01-31 RX ORDER — LEVOTHYROXINE SODIUM 125 MCG
1 TABLET ORAL
Qty: 0 | Refills: 0 | DISCHARGE
Start: 2018-01-31

## 2018-01-31 RX ORDER — LEVOTHYROXINE SODIUM 125 MCG
1 TABLET ORAL
Qty: 0 | Refills: 0 | COMMUNITY

## 2018-01-31 RX ADMIN — Medication 75 MICROGRAM(S): at 06:07

## 2018-01-31 RX ADMIN — LIDOCAINE 1 PATCH: 4 CREAM TOPICAL at 11:09

## 2018-01-31 RX ADMIN — Medication 650 MILLIGRAM(S): at 07:24

## 2018-01-31 RX ADMIN — Medication 1 TABLET(S): at 11:09

## 2018-01-31 RX ADMIN — AMLODIPINE BESYLATE 5 MILLIGRAM(S): 2.5 TABLET ORAL at 06:07

## 2018-01-31 RX ADMIN — Medication 25 MILLIGRAM(S): at 06:08

## 2018-01-31 RX ADMIN — Medication 650 MILLIGRAM(S): at 06:54

## 2018-01-31 RX ADMIN — POLYETHYLENE GLYCOL 3350 17 GRAM(S): 17 POWDER, FOR SOLUTION ORAL at 11:09

## 2018-01-31 RX ADMIN — HEPARIN SODIUM 5000 UNIT(S): 5000 INJECTION INTRAVENOUS; SUBCUTANEOUS at 06:07

## 2018-01-31 NOTE — DISCHARGE NOTE ADULT - CARE PLAN
Principal Discharge DX:	Head injury  Goal:	resolution, no further falls  Secondary Diagnosis:	Chest pain, unspecified, other  Secondary Diagnosis:	Abdominal pain  Secondary Diagnosis:	Arrhythmia  Secondary Diagnosis:	Hypothyroidism  Secondary Diagnosis:	Insomnia  Secondary Diagnosis:	Hypertension Principal Discharge DX:	Head injury  Goal:	resolution, no further falls  Assessment and plan of treatment:	You were admitted after falling down and injuring your head and your pelvis. No fractures were found and also you were not found to have any bleeding in your brain. 6 sutures were placed for your head injury. You are currently stable and are ready to go to the rehab facility.  If your headache significantly worsens or if you start feeling severely nauseous or start vomiting, or have any other concerning symptoms, please call 911 or go to the ER.  Secondary Diagnosis:	Chest pain, unspecified, other  Goal:	resolution  Assessment and plan of treatment:	You were experiencing chest pain upon admission. The tests which were performed did not reveal any acute cardiac conditions requiring treatment. This should improve.  Secondary Diagnosis:	Abdominal pain  Goal:	resolution  Assessment and plan of treatment:	You have a chronic abdominal pain. Please follow up with your primary provider to decide if any further workup is necessary.  Secondary Diagnosis:	Arrhythmia  Assessment and plan of treatment:	You have previously been diagnosed with arrhythmia. You have also been falling a lot hence anticoagulation is contraindicated in order to prevent any bleeding in your brain or anywhere else shall you fall again. Please follow up with Dr Prieto.  Secondary Diagnosis:	Hypothyroidism  Assessment and plan of treatment:	Please continue taking Synthroid 75 mg orally daily and follow up with your PCP.  Secondary Diagnosis:	Insomnia  Assessment and plan of treatment:	Please continue taking Melatonin.  Secondary Diagnosis:	Hypertension  Assessment and plan of treatment:	Your blood pressure was elevated during this hospitalization and we increased the dose of your blood pressure meds. Please continue taking your blood pressure medications and follow up with Dr Prieto.

## 2018-01-31 NOTE — PROGRESS NOTE ADULT - PROBLEM SELECTOR PLAN 5
cont. levothyroxine
Chronic, unclear etiology.  May be 2/2 indigestion vs. GERD. Normal lipase.   -continue to monitor
Chronic, unclear etiology.  May be 2/2 indigestion vs. GERD. Normal lipase.   -continue to monitor
cont. levothyroxine

## 2018-01-31 NOTE — PROGRESS NOTE ADULT - PROBLEM SELECTOR PROBLEM 3
Chest pain, unspecified, other
Essential hypertension
Essential hypertension
Chest pain, unspecified, other

## 2018-01-31 NOTE — PROGRESS NOTE ADULT - SUBJECTIVE AND OBJECTIVE BOX
Patient is a 89y old  Female who presents with a chief complaint of fall (31 Jan 2018 12:06)      INTERVAL HPI/OVERNIGHT EVENTS:    Pt. seen and examined at 10:45AM  Pt. c/o constipation; requesting an enema  Denies scalp pain or HA    Review of Systems: 12 point review of systems otherwise negative    MEDICATIONS  (STANDING):  amLODIPine   Tablet 5 milliGRAM(s) Oral daily  calcium carbonate  625 mG + Vitamin D (OsCal 250 + D) 1 Tablet(s) Oral daily  heparin  Injectable 5000 Unit(s) SubCutaneous every 12 hours  levothyroxine 75 MICROGram(s) Oral daily  lidocaine   Patch 1 Patch Transdermal daily  melatonin 5 milliGRAM(s) Oral at bedtime  metoprolol     tartrate 25 milliGRAM(s) Oral two times a day  polyethylene glycol 3350 17 Gram(s) Oral daily    MEDICATIONS  (PRN):  acetaminophen   Tablet. 650 milliGRAM(s) Oral every 6 hours PRN Moderate Pain (4 - 6)  bisacodyl Suppository 10 milliGRAM(s) Rectal daily PRN Constipation      Allergies    Alphagan (Unknown)  brimonidine ophthalmic (Unknown)    Intolerances          Vital Signs Last 24 Hrs  T(C): 36.4 (31 Jan 2018 16:17), Max: 36.8 (31 Jan 2018 06:02)  T(F): 97.6 (31 Jan 2018 16:17), Max: 98.2 (31 Jan 2018 06:02)  HR: 84 (31 Jan 2018 16:17) (77 - 90)  BP: 139/75 (31 Jan 2018 16:17) (114/59 - 183/71)  BP(mean): --  RR: 16 (31 Jan 2018 16:17) (16 - 18)  SpO2: 98% (31 Jan 2018 16:17) (92% - 98%)  CAPILLARY BLOOD GLUCOSE            Physical Exam:  (at 10:45AM)  Daily     Daily   General:  comfortable-appearing sitting in a chair in NAD  HEENT: scalp sutures intact  Abdomen: soft NT ND  Skin:  WWP  Neuro:  AAOx3, non-focal      LABS:                        11.4   7.8   )-----------( 179      ( 31 Jan 2018 05:35 )             36.2     01-31    136  |  97  |  13  ----------------------------<  116<H>  4.5   |  29  |  0.55    Ca    9.1      31 Jan 2018 05:42  Mg     1.8     01-31              RADIOLOGY & ADDITIONAL TESTS:    ---------------------------------------------------------------------------  I personally reviewed: [  ]EKG   [  ]CXR    [  ] CT    [  ]Other  ---------------------------------------------------------------------------  PLEASE CHECK WHEN PRESENT:     [  ]Heart Failure     [  ] Acute     [  ] Acute on Chronic     [  ] Chronic  -------------------------------------------------------------------     [  ]Diastolic [HFpEF]     [  ]Systolic [HFrEF]     [  ]Combined [HFpEF & HFrEF]     [  ]Other:  -------------------------------------------------------------------  [  ]BELEN     [  ]ATN     [  ]Reneal Medullary Necrosis     [  ]Renal Cortical Necrosis     [  ]Other Pathological Lesions:    [  ]CKD 1  [  ]CKD 2  [  ]CKD 3  [  ]CKD 4  [  ]CKD 5  [  ]Other  -------------------------------------------------------------------  [  ]Other/Unspecified:    --------------------------------------------------------------------    Abdominal Nutritional Status  [  ]Malnutrition: See Nutrition Note  [  ]Cachexia  [  ]Other:   [  ]Supplement Ordered:  [  ]Morbid Obesity (BMI >=40]

## 2018-01-31 NOTE — DISCHARGE NOTE ADULT - PLAN OF CARE
resolution, no further falls You were admitted after falling down and injuring your head and your pelvis. No fractures were found and also you were not found to have any bleeding in your brain. 6 sutures were placed for your head injury. You are currently stable and are ready to go to the rehab facility.  If your headache significantly worsens or if you start feeling severely nauseous or start vomiting, or have any other concerning symptoms, please call 911 or go to the ER. resolution You were experiencing chest pain upon admission. The tests which were performed did not reveal any acute cardiac conditions requiring treatment. This should improve. You have a chronic abdominal pain. Please follow up with your primary provider to decide if any further workup is necessary. You have previously been diagnosed with arrhythmia. You have also been falling a lot hence anticoagulation is contraindicated in order to prevent any bleeding in your brain or anywhere else shall you fall again. Please follow up with Dr Prieto. Please continue taking Synthroid 75 mg orally daily and follow up with your PCP. Please continue taking Melatonin. Your blood pressure was elevated during this hospitalization and we increased the dose of your blood pressure meds. Please continue taking your blood pressure medications and follow up with Dr Prieto.

## 2018-01-31 NOTE — PROGRESS NOTE ADULT - ATTENDING COMMENTS
Dispo: medically-clear for d/c to [facility]
Dispo: medically-clear for d/c to [facility]
Pt. seen and examined by me at 1:45PM; I have read Dr. Weaver's note, I agree w/ her findings and plan of care as documented; Pt. is medically-clear for d/c to [facility]

## 2018-01-31 NOTE — PROGRESS NOTE ADULT - PROBLEM SELECTOR PLAN 4
BP 190s/90s at presentation, asymptomatic. Denies high blood pressure on home readings. Now 154/67  - also received Hydralazine 10mg PO x1 on admission  -c/w Metoprolol 25mg BID  -added Norvasc 5mg QD - will go up if necessary
cont. beta-blocker; not on A/C d/t recurrent falls; has outpatient cardiology f/u
stable but still elevated  BP 190s/90s at presentation, asymptomatic. Denies high blood pressure on home readings. Now 154/67  - also received Hydralazine 10mg PO x1 on admission  -c/w Metoprolol 25mg BID (home on Toprol XR 25 mg po daily per primary Dr Prieto - he does not want her on any other BP meds 2/2 falls after discharge)  - c/w Norvasc 5mg QD
cont. beta-blocker; not on A/C d/t recurrent falls; has outpatient cardiology f/u

## 2018-01-31 NOTE — DISCHARGE NOTE ADULT - MEDICATION SUMMARY - MEDICATIONS TO TAKE
I will START or STAY ON the medications listed below when I get home from the hospital:    acetaminophen 325 mg oral tablet  -- 2 tab(s) by mouth every 6 hours, As needed, Moderate Pain (4 - 6)  -- Indication: For Pain    metoprolol tartrate 25 mg oral tablet  -- 1 tab(s) by mouth 2 times a day  -- Indication: For HTN    amLODIPine 5 mg oral tablet  -- 1 tab(s) by mouth once a day  -- Indication: For HTN    lidocaine 5% topical film  -- Apply on skin to affected area once a day  -- Indication: For Pain    polyethylene glycol 3350 oral powder for reconstitution  -- 17 gram(s) by mouth once a day  -- Indication: For Constipation    bisacodyl 10 mg rectal suppository  -- 1 suppository(ies) rectally once a day, As needed, Constipation  -- Indication: For Constipation    melatonin 5 mg oral tablet  -- 1 tab(s) by mouth once a day (at bedtime)  -- Indication: For insomnia    levothyroxine 75 mcg (0.075 mg) oral tablet  -- 1 tab(s) by mouth once a day  -- Indication: For Hypothyroidism    calcium (as carbonate)-vitamin D 250 mg-125 intl units oral tablet  -- 1 tab(s) by mouth once a day  -- Indication: For Nutrition, metabolism, and development symptoms

## 2018-01-31 NOTE — DISCHARGE NOTE ADULT - OTHER SIGNIFICANT FINDINGS
< from: CT Head No Cont (01.28.18 @ 15:43) >  FINDINGS: The CT examination demonstrates age-appropriate volume loss.   Periventricular white matter hypodensities noted consistent with   microvascular ischemic white matter disease. There is no midline shift or   extra axial collections. The gray white differentiation appears within   normal limits. There is no intracranial hemorrhage or acute transcortical   infarct. The bony windows demonstrates no fractures. The visualized   paranasal sinuses are within normal limits. The mastoid air cells are   well aerated. Prosthesis noted about the left orbital globe. Status post   bilateral cataract surgery. Significant subcutaneous soft tissue   swelling/hematoma formation and laceration noted in the occipital region.    IMPRESSION: No acute intracranial findings. Significant subcutaneous soft   tissue swelling/hematoma formation and laceration in the occipital region.    < end of copied text >

## 2018-01-31 NOTE — DISCHARGE NOTE ADULT - CARE PROVIDER_API CALL
Regino Prieto), Cardiovascular Disease; Internal Medicine; Nuclear Cardiology  [address] [place] [address]  Phone: [phone]  Fax: [phone]

## 2018-01-31 NOTE — PROGRESS NOTE ADULT - PROBLEM SELECTOR PROBLEM 2
Laceration of scalp
Laceration of scalp, initial encounter
Laceration of scalp, initial encounter
Laceration of scalp

## 2018-01-31 NOTE — DISCHARGE NOTE ADULT - HOSPITAL COURSE
89yo F with HTN, HLD, A. fib not on AC 2/2 frequent falls, DVT/PE, hypothyroidism presents s/p fall with CP in the ED now s/p 6 stiches, remained stable throughout the hospitalization and is ready for discharge.

## 2018-01-31 NOTE — PROGRESS NOTE ADULT - ASSESSMENT
89yo F with HTN, HLD, A. fib not on AC, DVT/PE, hypothyroidism presents s/p fall with CP in the ED now s/p 6 stiches.     Problem/Plan - 1:  ·  Problem: Fall.  Plan: Patient with one year history of falls s/p unsuccessful sx OS and subsequent vision loss.   -seen by PT, recommend [facility] - pt agreeing  - TSH nl,   - Vitamin D and RPR pending.     Problem/Plan - 2:  ·  Problem: Laceration of scalp.  Plan: s/p 6 sutures in the ED. Complaining of mild headache.   -Tylenol PRN.     Problem/Plan - 3:  ·  Problem: Chest pain, unspecified, other.  Plan: Patient with pleuritic chest pain and chest wall tenderness. Likely costochondritis. Did have EKG changes with TWI in lead III and aVF, resolved on repeat EKG. Trops negative.  - trops now <0.01, pending repeat EKG   -Tylenol PRN for pain.     Problem/Plan - 4:  ·  Problem: Hypertensive urgency.  Plan: BP 190s/90s at presentation, asymptomatic. Denies high blood pressure on home readings. Now 154/67  - also received Hydralazine 10mg PO x1 on admission  -c/w Metoprolol 25mg BID  -added Norvasc 5mg QD - will go up if necessary.     Problem/Plan - 5:  ·  Problem: Abdominal pain.  Plan: Chronic, unclear etiology.  May be 2/2 indigestion vs. GERD. Normal lipase.   -continue to monitor.
89yo F with HTN, HLD, A. fib not on AC, DVT/PE, hypothyroidism presents s/p fall with CP in the ED now s/p 6 stiches now awaiting [facility] placement.
89 y/o F w/
89 y/o F w/
89yo F with HTN, HLD, A. fib not on AC, DVT/PE, hypothyroidism presents s/p fall with CP in the ED now s/p 6 stiches.

## 2018-01-31 NOTE — DISCHARGE NOTE ADULT - PATIENT PORTAL LINK FT
“You can access the [employer] Patient Portal, offered by [facility], by registering with the following website: [email or web address]”

## 2018-01-31 NOTE — PROGRESS NOTE ADULT - PROBLEM SELECTOR PLAN 2
s/p 6 sutures in the ED. Complaining of mild headache.   -Tylenol PRN
s/p repair in ER; cont. Tylenol PRN pain; suture removal in ~1 week
s/p repair in ER; cont. Tylenol PRN pain; suture removal in ~1 week
s/p 6 sutures in the ED. Complaining of mild headache.   -Tylenol PRN

## 2018-01-31 NOTE — PROGRESS NOTE ADULT - PROBLEM SELECTOR PLAN 1
Patient with one year history of falls s/p unsuccessful sx OS and subsequent vision loss.   -seen by PT, recommend [facility] - pt agreeing  - OOB in chair  - TSH nl,   - Vitamin D and RPR pending
c/b recurrent falls, in setting of L eye blindness; cont. PT, OOB to chair
c/b recurrent falls, in setting of L eye blindness; cont. PT, OOB to chair
Patient with one year history of falls s/p unsuccessful sx OS and subsequent vision loss.   -seen by PT, recommend [facility] - pt agreeing  - TSH nl,   - Vitamin D and RPR pending

## 2018-02-02 DIAGNOSIS — H54.62 UNQUALIFIED VISUAL LOSS, LEFT EYE, NORMAL VISION RIGHT EYE: ICD-10-CM

## 2018-02-02 DIAGNOSIS — S01.01XA LACERATION WITHOUT FOREIGN BODY OF SCALP, INITIAL ENCOUNTER: ICD-10-CM

## 2018-02-02 DIAGNOSIS — G47.00 INSOMNIA, UNSPECIFIED: ICD-10-CM

## 2018-02-02 DIAGNOSIS — R10.9 UNSPECIFIED ABDOMINAL PAIN: ICD-10-CM

## 2018-02-02 DIAGNOSIS — R29.6 REPEATED FALLS: ICD-10-CM

## 2018-02-02 DIAGNOSIS — I16.0 HYPERTENSIVE URGENCY: ICD-10-CM

## 2018-02-02 DIAGNOSIS — Z86.711 PERSONAL HISTORY OF PULMONARY EMBOLISM: ICD-10-CM

## 2018-02-02 DIAGNOSIS — M94.0 CHONDROCOSTAL JUNCTION SYNDROME [TIETZE]: ICD-10-CM

## 2018-02-02 DIAGNOSIS — E78.5 HYPERLIPIDEMIA, UNSPECIFIED: ICD-10-CM
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
## 2018-02-02 DIAGNOSIS — I48.0 PAROXYSMAL ATRIAL FIBRILLATION: ICD-10-CM

## 2018-02-02 DIAGNOSIS — E03.9 HYPOTHYROIDISM, UNSPECIFIED: ICD-10-CM

## 2018-02-02 DIAGNOSIS — E55.9 VITAMIN D DEFICIENCY, UNSPECIFIED: ICD-10-CM
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
## 2018-02-02 DIAGNOSIS — I10 ESSENTIAL (PRIMARY) HYPERTENSION: ICD-10-CM

## 2018-02-02 DIAGNOSIS — Z86.718 PERSONAL HISTORY OF OTHER VENOUS THROMBOSIS AND EMBOLISM: ICD-10-CM

## 2018-02-28 ENCOUNTER — APPOINTMENT (OUTPATIENT)
Dept: INTERNAL MEDICINE | Facility: CLINIC | Age: 83
End: 2018-02-28
Payer: MEDICARE

## 2018-02-28 VITALS
OXYGEN SATURATION: 98 % | HEIGHT: 59 IN | DIASTOLIC BLOOD PRESSURE: 65 MMHG | RESPIRATION RATE: 13 BRPM | SYSTOLIC BLOOD PRESSURE: 110 MMHG | BODY MASS INDEX: 23.18 KG/M2 | HEART RATE: 84 BPM | TEMPERATURE: 98.3 F | WEIGHT: 115 LBS

## 2018-02-28 DIAGNOSIS — R26.81 UNSTEADINESS ON FEET: ICD-10-CM

## 2018-02-28 PROCEDURE — 99214 OFFICE O/P EST MOD 30 MIN: CPT

## 2018-03-19 NOTE — H&P ADULT - PROBLEM SELECTOR PLAN 7
[facility] GENERAL SURGERY DAILY PROGRESS NOTE:      Subjective: Patient seen and examined. No acute overnight events. +BM. Tolerating regular diet, but emesis x 1 and mild nausea, now improved         Objective:    MEDICATIONS  (STANDING):  acetaminophen   Oral Liquid - Peds 320 milliGRAM(s) Oral every 6 hours  cefTRIAXone IV Intermittent - Peds 1500 milliGRAM(s) IV Intermittent every 24 hours  dextrose 5% + sodium chloride 0.45% with potassium chloride 20 mEq/L. - Pediatric 1000 milliLiter(s) (75 mL/Hr) IV Continuous <Continuous>  ketorolac IV Intermittent - Peds. 13 milliGRAM(s) IV Intermittent every 6 hours  metroNIDAZOLE IV Intermittent - Peds 295 milliGRAM(s) IV Intermittent every 8 hours    MEDICATIONS  (PRN):  morphine  IV Intermittent - Peds 3 milliGRAM(s) IV Intermittent every 3 hours PRN Severe Pain (breakthrough)      Vital Signs Last 24 Hrs  T(C): 36.8 (19 Mar 2018 01:32), Max: 37.8 (18 Mar 2018 22:21)  T(F): 98.2 (19 Mar 2018 01:32), Max: 100 (18 Mar 2018 22:21)  HR: 132 (19 Mar 2018 01:32) (121 - 161)  BP: 101/61 (19 Mar 2018 01:32) (99/63 - 112/64)  BP(mean): --  RR: 21 (19 Mar 2018 01:32) (20 - 28)  SpO2: 98% (19 Mar 2018 01:32) (95% - 100%)    I&O's Detail    17 Mar 2018 07:01  -  18 Mar 2018 07:00  --------------------------------------------------------  IN:    dextrose 5% + sodium chloride 0.45% with potassium chloride 20 mEq/L. - Pediatri: 1500 mL    Lactated Ringers IV Bolus - Pediatric: 590 mL    Oral Fluid: 120 mL    Solution: 500 mL  Total IN: 2710 mL    OUT:    Voided: 1950 mL  Total OUT: 1950 mL    Total NET: 760 mL      18 Mar 2018 07:01  -  19 Mar 2018 05:46  --------------------------------------------------------  IN:    dextrose 5% + sodium chloride 0.45% with potassium chloride 20 mEq/L. - Pediatri: 715 mL    Oral Fluid: 240 mL    Solution: 70 mL  Total IN: 1025 mL    OUT:    Voided: 1300 mL  Total OUT: 1300 mL    Total NET: -275 mL          Daily     Daily     LABS:                Physical Exam:  Gen: NAD, resting comfortably   Pulm: unlabored breathing  Cards: NSR  Abd: soft, NT, ND, incisions c/d/i   Skin: no rash    RADIOLOGY & ADDITIONAL STUDIES: -check TSH  -c/w Synthroid 75mcg

## 2018-04-02 ENCOUNTER — RX RENEWAL (OUTPATIENT)
Age: 83
End: 2018-04-02

## 2018-04-04 ENCOUNTER — APPOINTMENT (OUTPATIENT)
Dept: INTERNAL MEDICINE | Facility: CLINIC | Age: 83
End: 2018-04-04
Payer: MEDICARE

## 2018-04-18 ENCOUNTER — APPOINTMENT (OUTPATIENT)
Dept: INTERNAL MEDICINE | Facility: CLINIC | Age: 83
End: 2018-04-18
Payer: MEDICARE

## 2018-04-18 VITALS
BODY MASS INDEX: 24.19 KG/M2 | HEART RATE: 87 BPM | OXYGEN SATURATION: 96 % | HEIGHT: 59 IN | RESPIRATION RATE: 13 BRPM | SYSTOLIC BLOOD PRESSURE: 110 MMHG | TEMPERATURE: 96.7 F | WEIGHT: 120 LBS | DIASTOLIC BLOOD PRESSURE: 78 MMHG

## 2018-04-18 DIAGNOSIS — M25.572 PAIN IN LEFT ANKLE AND JOINTS OF LEFT FOOT: ICD-10-CM

## 2018-04-18 PROCEDURE — 99213 OFFICE O/P EST LOW 20 MIN: CPT

## 2018-05-19 ENCOUNTER — EMERGENCY (EMERGENCY)
Facility: HOSPITAL | Age: 83
LOS: 1 days | Discharge: HOME | End: 2018-05-19
Attending: EMERGENCY MEDICINE | Admitting: EMERGENCY MEDICINE
Payer: MEDICARE

## 2018-05-19 VITALS
TEMPERATURE: 98 F | HEART RATE: 89 BPM | SYSTOLIC BLOOD PRESSURE: 147 MMHG | OXYGEN SATURATION: 96 % | WEIGHT: 117.95 LBS | RESPIRATION RATE: 20 BRPM | DIASTOLIC BLOOD PRESSURE: 74 MMHG

## 2018-05-19 VITALS
OXYGEN SATURATION: 95 % | TEMPERATURE: 98 F | SYSTOLIC BLOOD PRESSURE: 147 MMHG | HEART RATE: 90 BPM | DIASTOLIC BLOOD PRESSURE: 100 MMHG | RESPIRATION RATE: 18 BRPM

## 2018-05-19 DIAGNOSIS — Z88.8 ALLERGY STATUS TO OTHER DRUGS, MEDICAMENTS AND BIOLOGICAL SUBSTANCES STATUS: ICD-10-CM

## 2018-05-19 DIAGNOSIS — E78.5 HYPERLIPIDEMIA, UNSPECIFIED: ICD-10-CM

## 2018-05-19 DIAGNOSIS — Z98.890 OTHER SPECIFIED POSTPROCEDURAL STATES: Chronic | ICD-10-CM

## 2018-05-19 DIAGNOSIS — Z79.899 OTHER LONG TERM (CURRENT) DRUG THERAPY: ICD-10-CM

## 2018-05-19 DIAGNOSIS — M25.512 PAIN IN LEFT SHOULDER: ICD-10-CM

## 2018-05-19 DIAGNOSIS — I10 ESSENTIAL (PRIMARY) HYPERTENSION: ICD-10-CM

## 2018-05-19 DIAGNOSIS — M19.90 UNSPECIFIED OSTEOARTHRITIS, UNSPECIFIED SITE: ICD-10-CM

## 2018-05-19 DIAGNOSIS — Z90.49 ACQUIRED ABSENCE OF OTHER SPECIFIED PARTS OF DIGESTIVE TRACT: Chronic | ICD-10-CM

## 2018-05-19 LAB
ALBUMIN SERPL ELPH-MCNC: 4.1 G/DL — SIGNIFICANT CHANGE UP (ref 3.3–5)
ALP SERPL-CCNC: 68 U/L — SIGNIFICANT CHANGE UP (ref 40–120)
ALT FLD-CCNC: 13 U/L — SIGNIFICANT CHANGE UP (ref 10–45)
ANION GAP SERPL CALC-SCNC: 14 MMOL/L — SIGNIFICANT CHANGE UP (ref 5–17)
APTT BLD: 28.9 SEC — SIGNIFICANT CHANGE UP (ref 27.5–37.4)
AST SERPL-CCNC: 21 U/L — SIGNIFICANT CHANGE UP (ref 10–40)
BASOPHILS NFR BLD AUTO: 0.1 % — SIGNIFICANT CHANGE UP (ref 0–2)
BILIRUB SERPL-MCNC: 0.5 MG/DL — SIGNIFICANT CHANGE UP (ref 0.2–1.2)
BLD GP AB SCN SERPL QL: NEGATIVE — SIGNIFICANT CHANGE UP
BUN SERPL-MCNC: 18 MG/DL — SIGNIFICANT CHANGE UP (ref 7–23)
CALCIUM SERPL-MCNC: 9.2 MG/DL — SIGNIFICANT CHANGE UP (ref 8.4–10.5)
CHLORIDE SERPL-SCNC: 94 MMOL/L — LOW (ref 96–108)
CO2 SERPL-SCNC: 25 MMOL/L — SIGNIFICANT CHANGE UP (ref 22–31)
CREAT SERPL-MCNC: 0.49 MG/DL — LOW (ref 0.5–1.3)
EOSINOPHIL NFR BLD AUTO: 0.2 % — SIGNIFICANT CHANGE UP (ref 0–6)
GLUCOSE SERPL-MCNC: 214 MG/DL — HIGH (ref 70–99)
HCT VFR BLD CALC: 41.3 % — SIGNIFICANT CHANGE UP (ref 34.5–45)
HGB BLD-MCNC: 13.3 G/DL — SIGNIFICANT CHANGE UP (ref 11.5–15.5)
INR BLD: 1.26 — HIGH (ref 0.88–1.16)
LYMPHOCYTES # BLD AUTO: 5.9 % — LOW (ref 13–44)
MCHC RBC-ENTMCNC: 30.6 PG — SIGNIFICANT CHANGE UP (ref 27–34)
MCHC RBC-ENTMCNC: 32.2 G/DL — SIGNIFICANT CHANGE UP (ref 32–36)
MCV RBC AUTO: 95.2 FL — SIGNIFICANT CHANGE UP (ref 80–100)
MONOCYTES NFR BLD AUTO: 11.5 % — SIGNIFICANT CHANGE UP (ref 2–14)
NEUTROPHILS NFR BLD AUTO: 82.3 % — HIGH (ref 43–77)
PLATELET # BLD AUTO: 192 K/UL — SIGNIFICANT CHANGE UP (ref 150–400)
POTASSIUM SERPL-MCNC: 4.1 MMOL/L — SIGNIFICANT CHANGE UP (ref 3.5–5.3)
POTASSIUM SERPL-SCNC: 4.1 MMOL/L — SIGNIFICANT CHANGE UP (ref 3.5–5.3)
PROT SERPL-MCNC: 7.1 G/DL — SIGNIFICANT CHANGE UP (ref 6–8.3)
PROTHROM AB SERPL-ACNC: 14 SEC — HIGH (ref 9.8–12.7)
RBC # BLD: 4.34 M/UL — SIGNIFICANT CHANGE UP (ref 3.8–5.2)
RBC # FLD: 15.3 % — SIGNIFICANT CHANGE UP (ref 10.3–16.9)
RH IG SCN BLD-IMP: POSITIVE — SIGNIFICANT CHANGE UP
SODIUM SERPL-SCNC: 133 MMOL/L — LOW (ref 135–145)
WBC # BLD: 10.3 K/UL — SIGNIFICANT CHANGE UP (ref 3.8–10.5)
WBC # FLD AUTO: 10.3 K/UL — SIGNIFICANT CHANGE UP (ref 3.8–10.5)

## 2018-05-19 PROCEDURE — 85610 PROTHROMBIN TIME: CPT

## 2018-05-19 PROCEDURE — 96374 THER/PROPH/DIAG INJ IV PUSH: CPT

## 2018-05-19 PROCEDURE — 99284 EMERGENCY DEPT VISIT MOD MDM: CPT | Mod: 25

## 2018-05-19 PROCEDURE — 71045 X-RAY EXAM CHEST 1 VIEW: CPT

## 2018-05-19 PROCEDURE — 93005 ELECTROCARDIOGRAM TRACING: CPT

## 2018-05-19 PROCEDURE — 86900 BLOOD TYPING SEROLOGIC ABO: CPT

## 2018-05-19 PROCEDURE — 80053 COMPREHEN METABOLIC PANEL: CPT

## 2018-05-19 PROCEDURE — 36415 COLL VENOUS BLD VENIPUNCTURE: CPT

## 2018-05-19 PROCEDURE — 96375 TX/PRO/DX INJ NEW DRUG ADDON: CPT

## 2018-05-19 PROCEDURE — 86901 BLOOD TYPING SEROLOGIC RH(D): CPT

## 2018-05-19 PROCEDURE — 93010 ELECTROCARDIOGRAM REPORT: CPT

## 2018-05-19 PROCEDURE — 85025 COMPLETE CBC W/AUTO DIFF WBC: CPT

## 2018-05-19 PROCEDURE — 73030 X-RAY EXAM OF SHOULDER: CPT

## 2018-05-19 PROCEDURE — 86850 RBC ANTIBODY SCREEN: CPT

## 2018-05-19 PROCEDURE — 73030 X-RAY EXAM OF SHOULDER: CPT | Mod: 26

## 2018-05-19 PROCEDURE — 85730 THROMBOPLASTIN TIME PARTIAL: CPT

## 2018-05-19 PROCEDURE — 71045 X-RAY EXAM CHEST 1 VIEW: CPT | Mod: 26

## 2018-05-19 RX ORDER — SODIUM CHLORIDE 9 MG/ML
1000 INJECTION INTRAMUSCULAR; INTRAVENOUS; SUBCUTANEOUS ONCE
Qty: 0 | Refills: 0 | Status: COMPLETED | OUTPATIENT
Start: 2018-05-19 | End: 2018-05-19

## 2018-05-19 RX ORDER — KETOROLAC TROMETHAMINE 30 MG/ML
15 SYRINGE (ML) INJECTION ONCE
Qty: 0 | Refills: 0 | Status: DISCONTINUED | OUTPATIENT
Start: 2018-05-19 | End: 2018-05-19

## 2018-05-19 RX ORDER — MORPHINE SULFATE 50 MG/1
2 CAPSULE, EXTENDED RELEASE ORAL ONCE
Qty: 0 | Refills: 0 | Status: DISCONTINUED | OUTPATIENT
Start: 2018-05-19 | End: 2018-05-19

## 2018-05-19 RX ADMIN — SODIUM CHLORIDE 1000 MILLILITER(S): 9 INJECTION INTRAMUSCULAR; INTRAVENOUS; SUBCUTANEOUS at 15:26

## 2018-05-19 RX ADMIN — MORPHINE SULFATE 2 MILLIGRAM(S): 50 CAPSULE, EXTENDED RELEASE ORAL at 14:45

## 2018-05-19 RX ADMIN — MORPHINE SULFATE 2 MILLIGRAM(S): 50 CAPSULE, EXTENDED RELEASE ORAL at 15:00

## 2018-05-19 RX ADMIN — Medication 15 MILLIGRAM(S): at 16:51

## 2018-05-19 NOTE — ED PROVIDER NOTE - OBJECTIVE STATEMENT
89 y/o female with a PMHx of HTN, HLD, hypothyroidism, afib who is right hand dominant is present with atraumatic left shoulder pain. Pt states pain started last night and she was unable to lift her arm above her head. Pt reports pain is located under shoulder. She states in the past she has pain in her shoulder requiring injections for the pain. She denies the following: fall, past injury, numbness, tingling, swelling, redness, fever, chills, chest pain, sob, difficulty breathing.

## 2018-05-19 NOTE — ED PROVIDER NOTE - PMH
Arrhythmia    DVT (deep venous thrombosis)    Essential hypertension  Hypertension  Hyperlipidemia  Hyperlipidemia  Hypothyroidism  Hypothyroid  Insomnia  Insomnia  PE (pulmonary thromboembolism) Patient/Family

## 2018-05-19 NOTE — ED PROVIDER NOTE - MEDICAL DECISION MAKING DETAILS
89 y/o female with atraumatic left shoulder pain with LROM. 89 y/o female with atraumatic left shoulder pain with LROM. Xray negative for fx. dislocation. Reveals arthritis changes. Possible OA of shoulder. Motor function and sensation intact. Labs normal. Pain improved. Will fu with ortho.

## 2018-05-19 NOTE — ED PROVIDER NOTE - ATTENDING CONTRIBUTION TO CARE
89 y/o female with a PMHx of HTN, HLD, hypothyroidism, afib who is right hand dominant is present with atraumatic left shoulder pain. Pt states pain started last night and she was unable to lift her arm above her head. Pt reports pain is located under shoulder. She states in the past she has pain in her shoulder requiring injections for the pain. She denies the following: fall, past injury, numbness, tingling, swelling, redness, fever, chills, chest pain, sob, difficulty breathing.    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  Pt states she has had similar pain before and usually receives steroid injection.  Will obtain follow up this week.  Pt has aid accompanying her.

## 2018-06-29 ENCOUNTER — RX RENEWAL (OUTPATIENT)
Age: 83
End: 2018-06-29

## 2018-07-26 PROBLEM — Z85.828 HISTORY OF BASAL CELL CARCINOMA: Status: RESOLVED | Noted: 2017-03-21 | Resolved: 2018-07-26

## 2018-07-31 ENCOUNTER — RX RENEWAL (OUTPATIENT)
Age: 83
End: 2018-07-31

## 2018-08-13 ENCOUNTER — APPOINTMENT (OUTPATIENT)
Dept: HEART AND VASCULAR | Facility: CLINIC | Age: 83
End: 2018-08-13
Payer: MEDICARE

## 2018-08-13 VITALS
RESPIRATION RATE: 14 BRPM | BODY MASS INDEX: 24.19 KG/M2 | DIASTOLIC BLOOD PRESSURE: 78 MMHG | SYSTOLIC BLOOD PRESSURE: 127 MMHG | OXYGEN SATURATION: 96 % | TEMPERATURE: 96.7 F | HEIGHT: 59 IN | WEIGHT: 120 LBS | HEART RATE: 72 BPM

## 2018-08-13 PROCEDURE — 99214 OFFICE O/P EST MOD 30 MIN: CPT

## 2018-08-21 PROBLEM — I26.99 OTHER PULMONARY EMBOLISM WITHOUT ACUTE COR PULMONALE: Chronic | Status: ACTIVE | Noted: 2018-01-28

## 2018-08-21 PROBLEM — I82.409 ACUTE EMBOLISM AND THROMBOSIS OF UNSPECIFIED DEEP VEINS OF UNSPECIFIED LOWER EXTREMITY: Chronic | Status: ACTIVE | Noted: 2018-01-28

## 2018-08-21 PROBLEM — I49.9 CARDIAC ARRHYTHMIA, UNSPECIFIED: Chronic | Status: ACTIVE | Noted: 2018-01-28

## 2018-09-10 ENCOUNTER — APPOINTMENT (OUTPATIENT)
Dept: HEART AND VASCULAR | Facility: CLINIC | Age: 83
End: 2018-09-10

## 2018-10-12 ENCOUNTER — APPOINTMENT (OUTPATIENT)
Dept: HEART AND VASCULAR | Facility: CLINIC | Age: 83
End: 2018-10-12
Payer: MEDICARE

## 2018-10-12 VITALS
OXYGEN SATURATION: 97 % | BODY MASS INDEX: 24.19 KG/M2 | SYSTOLIC BLOOD PRESSURE: 108 MMHG | RESPIRATION RATE: 14 BRPM | TEMPERATURE: 97.5 F | WEIGHT: 120 LBS | HEART RATE: 78 BPM | DIASTOLIC BLOOD PRESSURE: 65 MMHG | HEIGHT: 59 IN

## 2018-10-12 DIAGNOSIS — N31.9 NEUROMUSCULAR DYSFUNCTION OF BLADDER, UNSPECIFIED: ICD-10-CM

## 2018-10-12 PROCEDURE — 99214 OFFICE O/P EST MOD 30 MIN: CPT

## 2018-10-12 RX ORDER — LEVOTHYROXINE SODIUM 0.07 MG/1
75 TABLET ORAL
Qty: 30 | Refills: 1 | Status: DISCONTINUED | COMMUNITY
Start: 2018-04-02 | End: 2018-10-12

## 2018-11-02 ENCOUNTER — APPOINTMENT (OUTPATIENT)
Dept: HEART AND VASCULAR | Facility: CLINIC | Age: 83
End: 2018-11-02
Payer: MEDICARE

## 2018-11-02 VITALS
TEMPERATURE: 97.5 F | HEART RATE: 65 BPM | SYSTOLIC BLOOD PRESSURE: 120 MMHG | OXYGEN SATURATION: 95 % | HEIGHT: 59 IN | RESPIRATION RATE: 14 BRPM | DIASTOLIC BLOOD PRESSURE: 70 MMHG

## 2018-11-02 PROCEDURE — 99214 OFFICE O/P EST MOD 30 MIN: CPT

## 2018-11-02 NOTE — PHYSICAL EXAM
[General Appearance - Well Developed] : well developed [Normal Appearance] : normal appearance [Well Groomed] : well groomed [General Appearance - Well Nourished] : well nourished [No Deformities] : no deformities [General Appearance - In No Acute Distress] : no acute distress [Normal Conjunctiva] : the conjunctiva exhibited no abnormalities [Eyelids - No Xanthelasma] : the eyelids demonstrated no xanthelasmas [Normal Oral Mucosa] : normal oral mucosa [No Oral Pallor] : no oral pallor [No Oral Cyanosis] : no oral cyanosis [Normal Jugular Venous A Waves Present] : normal jugular venous A waves present [Normal Jugular Venous V Waves Present] : normal jugular venous V waves present [No Jugular Venous Lauren A Waves] : no jugular venous lauren A waves [Respiration, Rhythm And Depth] : normal respiratory rhythm and effort [Exaggerated Use Of Accessory Muscles For Inspiration] : no accessory muscle use [Auscultation Breath Sounds / Voice Sounds] : lungs were clear to auscultation bilaterally [Heart Rate And Rhythm] : heart rate and rhythm were normal [Heart Sounds] : normal S1 and S2 [Abdomen Soft] : soft [Abdomen Tenderness] : non-tender [Abdomen Mass (___ Cm)] : no abdominal mass palpated [Abnormal Walk] : normal gait [Gait - Sufficient For Exercise Testing] : the gait was sufficient for exercise testing [Nail Clubbing] : no clubbing of the fingernails [Cyanosis, Localized] : no localized cyanosis [Petechial Hemorrhages (___cm)] : no petechial hemorrhages [Skin Color & Pigmentation] : normal skin color and pigmentation [] : no rash [No Venous Stasis] : no venous stasis [Skin Lesions] : no skin lesions [No Skin Ulcers] : no skin ulcer [No Xanthoma] : no  xanthoma was observed [Oriented To Time, Place, And Person] : oriented to person, place, and time [Affect] : the affect was normal [Mood] : the mood was normal [No Anxiety] : not feeling anxious [FreeTextEntry1] : 1-2/6 trevor

## 2018-11-02 NOTE — HISTORY OF PRESENT ILLNESS
[FreeTextEntry1] : f/u of AF, fall. was in [facility] and then rehab\par BP was labile in [facility], started norvasc\par has edema in legs and lips since starting\par feels well otherwise\par still unsteady, uses walker\par is now using the rolater/cane\par 11/2- edema has resolved off amlodapine\par lip swelling gone\par her BP is ok as well\par no new comps

## 2019-02-08 ENCOUNTER — APPOINTMENT (OUTPATIENT)
Dept: HEART AND VASCULAR | Facility: CLINIC | Age: 84
End: 2019-02-08
Payer: MEDICARE

## 2019-02-08 VITALS
DIASTOLIC BLOOD PRESSURE: 71 MMHG | HEIGHT: 59 IN | OXYGEN SATURATION: 96 % | RESPIRATION RATE: 14 BRPM | WEIGHT: 120 LBS | HEART RATE: 67 BPM | SYSTOLIC BLOOD PRESSURE: 118 MMHG | BODY MASS INDEX: 24.19 KG/M2

## 2019-02-08 VITALS — WEIGHT: 122 LBS | BODY MASS INDEX: 24.64 KG/M2

## 2019-02-08 DIAGNOSIS — I48.0 PAROXYSMAL ATRIAL FIBRILLATION: ICD-10-CM

## 2019-02-08 PROCEDURE — 36415 COLL VENOUS BLD VENIPUNCTURE: CPT

## 2019-02-08 PROCEDURE — 99214 OFFICE O/P EST MOD 30 MIN: CPT

## 2019-02-08 PROCEDURE — 93000 ELECTROCARDIOGRAM COMPLETE: CPT

## 2019-02-08 NOTE — DISCUSSION/SUMMARY
[___ Month(s)] : [unfilled] month(s) [With Me] : with me [FreeTextEntry3] : bp check, chem 8 [FreeTextEntry1] : add losartan\par applying for increased home help

## 2019-02-08 NOTE — HISTORY OF PRESENT ILLNESS
[FreeTextEntry1] : BP mostly >140 systolic\par still unsteady, uses walker\par may need wheelchair \par several mechanical fall since I last saw her\par would greatl;y benefit from 24 home care\par

## 2019-02-11 LAB
ALBUMIN SERPL ELPH-MCNC: 4 G/DL
ALP BLD-CCNC: 65 U/L
ALT SERPL-CCNC: 12 U/L
ANION GAP SERPL CALC-SCNC: 12 MMOL/L
AST SERPL-CCNC: 17 U/L
BASOPHILS # BLD AUTO: 0.03 K/UL
BASOPHILS NFR BLD AUTO: 0.5 %
BILIRUB SERPL-MCNC: 0.4 MG/DL
BUN SERPL-MCNC: 16 MG/DL
CALCIUM SERPL-MCNC: 9.7 MG/DL
CHLORIDE SERPL-SCNC: 104 MMOL/L
CHOLEST SERPL-MCNC: 187 MG/DL
CHOLEST/HDLC SERPL: 2.7 RATIO
CO2 SERPL-SCNC: 25 MMOL/L
CREAT SERPL-MCNC: 0.57 MG/DL
EOSINOPHIL # BLD AUTO: 0.07 K/UL
EOSINOPHIL NFR BLD AUTO: 1.1 %
GLUCOSE SERPL-MCNC: 114 MG/DL
HBA1C MFR BLD HPLC: 6.1 %
HCT VFR BLD CALC: 41.6 %
HDLC SERPL-MCNC: 70 MG/DL
HGB BLD-MCNC: 12.7 G/DL
IMM GRANULOCYTES NFR BLD AUTO: 0.2 %
LDLC SERPL CALC-MCNC: 106 MG/DL
LYMPHOCYTES # BLD AUTO: 1.26 K/UL
LYMPHOCYTES NFR BLD AUTO: 18.9 %
MAN DIFF?: NORMAL
MCHC RBC-ENTMCNC: 30.5 GM/DL
MCHC RBC-ENTMCNC: 31.9 PG
MCV RBC AUTO: 104.5 FL
MONOCYTES # BLD AUTO: 0.84 K/UL
MONOCYTES NFR BLD AUTO: 12.6 %
NEUTROPHILS # BLD AUTO: 4.44 K/UL
NEUTROPHILS NFR BLD AUTO: 66.7 %
PLATELET # BLD AUTO: 217 K/UL
POTASSIUM SERPL-SCNC: 4.4 MMOL/L
PROT SERPL-MCNC: 6.5 G/DL
RBC # BLD: 3.98 M/UL
RBC # FLD: 15.5 %
SODIUM SERPL-SCNC: 141 MMOL/L
TRIGL SERPL-MCNC: 57 MG/DL
TSH SERPL-ACNC: 1.43 UIU/ML
WBC # FLD AUTO: 6.65 K/UL

## 2019-02-12 RX ORDER — LOSARTAN POTASSIUM 25 MG/1
25 TABLET, FILM COATED ORAL DAILY
Qty: 1 | Refills: 1 | Status: ACTIVE | COMMUNITY
Start: 2019-02-12 | End: 1900-01-01

## 2019-03-20 ENCOUNTER — APPOINTMENT (OUTPATIENT)
Dept: HEART AND VASCULAR | Facility: CLINIC | Age: 84
End: 2019-03-20
Payer: MEDICARE

## 2019-03-20 VITALS
HEIGHT: 59 IN | OXYGEN SATURATION: 97 % | WEIGHT: 122 LBS | BODY MASS INDEX: 24.6 KG/M2 | HEART RATE: 70 BPM | SYSTOLIC BLOOD PRESSURE: 118 MMHG | DIASTOLIC BLOOD PRESSURE: 60 MMHG | RESPIRATION RATE: 14 BRPM

## 2019-03-20 PROCEDURE — 99214 OFFICE O/P EST MOD 30 MIN: CPT

## 2019-03-20 NOTE — PHYSICAL EXAM
[Normal Appearance] : normal appearance [General Appearance - Well Developed] : well developed [Well Groomed] : well groomed [General Appearance - Well Nourished] : well nourished [No Deformities] : no deformities [Normal Conjunctiva] : the conjunctiva exhibited no abnormalities [General Appearance - In No Acute Distress] : no acute distress [Eyelids - No Xanthelasma] : the eyelids demonstrated no xanthelasmas [Normal Oral Mucosa] : normal oral mucosa [No Oral Pallor] : no oral pallor [No Oral Cyanosis] : no oral cyanosis [Normal Jugular Venous A Waves Present] : normal jugular venous A waves present [Normal Jugular Venous V Waves Present] : normal jugular venous V waves present [No Jugular Venous Lauren A Waves] : no jugular venous lauren A waves [Respiration, Rhythm And Depth] : normal respiratory rhythm and effort [Exaggerated Use Of Accessory Muscles For Inspiration] : no accessory muscle use [Auscultation Breath Sounds / Voice Sounds] : lungs were clear to auscultation bilaterally [Heart Rate And Rhythm] : heart rate and rhythm were normal [Heart Sounds] : normal S1 and S2 [Abdomen Soft] : soft [Abdomen Tenderness] : non-tender [Abdomen Mass (___ Cm)] : no abdominal mass palpated [Gait - Sufficient For Exercise Testing] : the gait was sufficient for exercise testing [Abnormal Walk] : normal gait [Cyanosis, Localized] : no localized cyanosis [Nail Clubbing] : no clubbing of the fingernails [Petechial Hemorrhages (___cm)] : no petechial hemorrhages [Skin Color & Pigmentation] : normal skin color and pigmentation [] : no rash [No Venous Stasis] : no venous stasis [Skin Lesions] : no skin lesions [No Skin Ulcers] : no skin ulcer [No Xanthoma] : no  xanthoma was observed [Oriented To Time, Place, And Person] : oriented to person, place, and time [Affect] : the affect was normal [Mood] : the mood was normal [No Anxiety] : not feeling anxious [FreeTextEntry1] : 1-2/6 trevor

## 2019-03-20 NOTE — DISCUSSION/SUMMARY
[___ Month(s)] : [unfilled] month(s) [With Me] : with me [FreeTextEntry1] : take losartan in the evening with the second toprol dose\par bp good now\par check chem 8

## 2019-03-20 NOTE — HISTORY OF PRESENT ILLNESS
[FreeTextEntry1] : BP mostly better but still high in morning \par still unsteady, uses walker\par may need wheelchair \par several mechanical fall since I last saw her\par would greatl;y benefit from 24 home care\par \par

## 2019-03-21 LAB
ANION GAP SERPL CALC-SCNC: 12 MMOL/L
BUN SERPL-MCNC: 18 MG/DL
CALCIUM SERPL-MCNC: 9.5 MG/DL
CHLORIDE SERPL-SCNC: 103 MMOL/L
CO2 SERPL-SCNC: 25 MMOL/L
CREAT SERPL-MCNC: 0.53 MG/DL
GLUCOSE SERPL-MCNC: 87 MG/DL
POTASSIUM SERPL-SCNC: 4.6 MMOL/L
SODIUM SERPL-SCNC: 140 MMOL/L

## 2019-04-14 DIAGNOSIS — F03.90 UNSPECIFIED DEMENTIA W/OUT BEHAVIORAL DISTURBANCE: ICD-10-CM

## 2019-04-14 DIAGNOSIS — R32 UNSPECIFIED URINARY INCONTINENCE: ICD-10-CM

## 2019-04-18 PROBLEM — R32 URINARY INCONTINENCE IN FEMALE: Status: ACTIVE | Noted: 2019-04-18

## 2019-04-18 PROBLEM — F03.90 DEMENTIA, SENILE: Status: ACTIVE | Noted: 2019-04-18

## 2019-05-03 ENCOUNTER — APPOINTMENT (OUTPATIENT)
Dept: HEART AND VASCULAR | Facility: CLINIC | Age: 84
End: 2019-05-03
Payer: MEDICARE

## 2019-05-03 ENCOUNTER — LABORATORY RESULT (OUTPATIENT)
Age: 84
End: 2019-05-03

## 2019-05-03 VITALS
HEIGHT: 59 IN | SYSTOLIC BLOOD PRESSURE: 118 MMHG | RESPIRATION RATE: 14 BRPM | DIASTOLIC BLOOD PRESSURE: 70 MMHG | BODY MASS INDEX: 25 KG/M2 | OXYGEN SATURATION: 97 % | HEART RATE: 60 BPM | WEIGHT: 124 LBS

## 2019-05-03 DIAGNOSIS — R49.9 UNSPECIFIED VOICE AND RESONANCE DISORDER: ICD-10-CM

## 2019-05-03 PROCEDURE — 36415 COLL VENOUS BLD VENIPUNCTURE: CPT

## 2019-05-03 PROCEDURE — 99214 OFFICE O/P EST MOD 30 MIN: CPT

## 2019-05-03 NOTE — HISTORY OF PRESENT ILLNESS
[FreeTextEntry1] : BP mostly better but still high in morning, low at times in afternoon and she feels tired\par still unsteady, uses walker\par may need wheelchair \par now has 24 home care\par c/o voice changes\par \par

## 2019-05-03 NOTE — DISCUSSION/SUMMARY
[___ Month(s)] : [unfilled] month(s) [FreeTextEntry1] : referred ENT\par stop losartan and try taking metoprolol in the evening [With Me] : with me

## 2019-05-03 NOTE — PHYSICAL EXAM
[Well Groomed] : well groomed [Normal Appearance] : normal appearance [General Appearance - Well Developed] : well developed [No Deformities] : no deformities [General Appearance - In No Acute Distress] : no acute distress [General Appearance - Well Nourished] : well nourished [Normal Conjunctiva] : the conjunctiva exhibited no abnormalities [Eyelids - No Xanthelasma] : the eyelids demonstrated no xanthelasmas [Normal Oral Mucosa] : normal oral mucosa [No Oral Pallor] : no oral pallor [No Oral Cyanosis] : no oral cyanosis [Normal Jugular Venous A Waves Present] : normal jugular venous A waves present [Normal Jugular Venous V Waves Present] : normal jugular venous V waves present [No Jugular Venous Lauren A Waves] : no jugular venous lauren A waves [Respiration, Rhythm And Depth] : normal respiratory rhythm and effort [Exaggerated Use Of Accessory Muscles For Inspiration] : no accessory muscle use [Auscultation Breath Sounds / Voice Sounds] : lungs were clear to auscultation bilaterally [Heart Rate And Rhythm] : heart rate and rhythm were normal [Heart Sounds] : normal S1 and S2 [Abdomen Soft] : soft [Abdomen Tenderness] : non-tender [Abnormal Walk] : normal gait [Gait - Sufficient For Exercise Testing] : the gait was sufficient for exercise testing [Abdomen Mass (___ Cm)] : no abdominal mass palpated [Petechial Hemorrhages (___cm)] : no petechial hemorrhages [Cyanosis, Localized] : no localized cyanosis [Nail Clubbing] : no clubbing of the fingernails [] : no rash [Skin Color & Pigmentation] : normal skin color and pigmentation [No Venous Stasis] : no venous stasis [No Xanthoma] : no  xanthoma was observed [Skin Lesions] : no skin lesions [No Skin Ulcers] : no skin ulcer [Mood] : the mood was normal [Affect] : the affect was normal [Oriented To Time, Place, And Person] : oriented to person, place, and time [No Anxiety] : not feeling anxious [FreeTextEntry1] : 1-2/6 trevor

## 2019-05-04 LAB
ALBUMIN SERPL ELPH-MCNC: 4.2 G/DL
ALP BLD-CCNC: 71 U/L
ALT SERPL-CCNC: 13 U/L
ANION GAP SERPL CALC-SCNC: 13 MMOL/L
AST SERPL-CCNC: 19 U/L
BASOPHILS # BLD AUTO: 0.04 K/UL
BASOPHILS NFR BLD AUTO: 0.7 %
BILIRUB SERPL-MCNC: 0.5 MG/DL
BUN SERPL-MCNC: 24 MG/DL
CALCIUM SERPL-MCNC: 9.3 MG/DL
CHLORIDE SERPL-SCNC: 102 MMOL/L
CHOLEST SERPL-MCNC: 191 MG/DL
CHOLEST/HDLC SERPL: 2.7 RATIO
CO2 SERPL-SCNC: 24 MMOL/L
CREAT SERPL-MCNC: 0.48 MG/DL
EOSINOPHIL # BLD AUTO: 0.04 K/UL
EOSINOPHIL NFR BLD AUTO: 0.7 %
ESTIMATED AVERAGE GLUCOSE: 126 MG/DL
GLUCOSE SERPL-MCNC: 116 MG/DL
HBA1C MFR BLD HPLC: 6 %
HCT VFR BLD CALC: 40.5 %
HDLC SERPL-MCNC: 70 MG/DL
HGB BLD-MCNC: 12.9 G/DL
IMM GRANULOCYTES NFR BLD AUTO: 0.3 %
LDLC SERPL CALC-MCNC: 103 MG/DL
LYMPHOCYTES # BLD AUTO: 1.13 K/UL
LYMPHOCYTES NFR BLD AUTO: 19.3 %
MAN DIFF?: NORMAL
MCHC RBC-ENTMCNC: 31.9 GM/DL
MCHC RBC-ENTMCNC: 33.1 PG
MCV RBC AUTO: 103.8 FL
MONOCYTES # BLD AUTO: 0.71 K/UL
MONOCYTES NFR BLD AUTO: 12.1 %
NEUTROPHILS # BLD AUTO: 3.91 K/UL
NEUTROPHILS NFR BLD AUTO: 66.9 %
PLATELET # BLD AUTO: 193 K/UL
POTASSIUM SERPL-SCNC: 4.6 MMOL/L
PROT SERPL-MCNC: 6.6 G/DL
RBC # BLD: 3.9 M/UL
RBC # FLD: 15.2 %
SODIUM SERPL-SCNC: 138 MMOL/L
TRIGL SERPL-MCNC: 92 MG/DL
WBC # FLD AUTO: 5.85 K/UL

## 2019-05-06 LAB — TSH SERPL-ACNC: 1.69 UIU/ML

## 2019-05-08 ENCOUNTER — APPOINTMENT (OUTPATIENT)
Dept: HEART AND VASCULAR | Facility: CLINIC | Age: 84
End: 2019-05-08

## 2019-05-20 ENCOUNTER — CLINICAL ADVICE (OUTPATIENT)
Age: 84
End: 2019-05-20

## 2019-06-24 ENCOUNTER — APPOINTMENT (OUTPATIENT)
Dept: HEART AND VASCULAR | Facility: CLINIC | Age: 84
End: 2019-06-24
Payer: MEDICARE

## 2019-06-24 VITALS
DIASTOLIC BLOOD PRESSURE: 70 MMHG | BODY MASS INDEX: 25.2 KG/M2 | HEART RATE: 61 BPM | HEIGHT: 59 IN | WEIGHT: 125 LBS | OXYGEN SATURATION: 97 % | SYSTOLIC BLOOD PRESSURE: 130 MMHG | TEMPERATURE: 98 F | RESPIRATION RATE: 14 BRPM

## 2019-06-24 PROCEDURE — 99214 OFFICE O/P EST MOD 30 MIN: CPT

## 2019-06-24 PROCEDURE — 93000 ELECTROCARDIOGRAM COMPLETE: CPT

## 2019-06-24 NOTE — PHYSICAL EXAM
[General Appearance - Well Developed] : well developed [Normal Appearance] : normal appearance [Well Groomed] : well groomed [General Appearance - Well Nourished] : well nourished [General Appearance - In No Acute Distress] : no acute distress [No Deformities] : no deformities [Normal Conjunctiva] : the conjunctiva exhibited no abnormalities [Eyelids - No Xanthelasma] : the eyelids demonstrated no xanthelasmas [Normal Oral Mucosa] : normal oral mucosa [No Oral Cyanosis] : no oral cyanosis [No Oral Pallor] : no oral pallor [Normal Jugular Venous A Waves Present] : normal jugular venous A waves present [Normal Jugular Venous V Waves Present] : normal jugular venous V waves present [No Jugular Venous Lauren A Waves] : no jugular venous lauren A waves [Respiration, Rhythm And Depth] : normal respiratory rhythm and effort [Exaggerated Use Of Accessory Muscles For Inspiration] : no accessory muscle use [Auscultation Breath Sounds / Voice Sounds] : lungs were clear to auscultation bilaterally [Heart Rate And Rhythm] : heart rate and rhythm were normal [Heart Sounds] : normal S1 and S2 [Abdomen Soft] : soft [Abdomen Tenderness] : non-tender [Abdomen Mass (___ Cm)] : no abdominal mass palpated [Abnormal Walk] : normal gait [Gait - Sufficient For Exercise Testing] : the gait was sufficient for exercise testing [Cyanosis, Localized] : no localized cyanosis [Nail Clubbing] : no clubbing of the fingernails [Petechial Hemorrhages (___cm)] : no petechial hemorrhages [Skin Color & Pigmentation] : normal skin color and pigmentation [] : no rash [No Venous Stasis] : no venous stasis [Skin Lesions] : no skin lesions [No Skin Ulcers] : no skin ulcer [No Xanthoma] : no  xanthoma was observed [Affect] : the affect was normal [Oriented To Time, Place, And Person] : oriented to person, place, and time [No Anxiety] : not feeling anxious [Mood] : the mood was normal [FreeTextEntry1] : 1-2/6 trevor

## 2019-06-24 NOTE — DISCUSSION/SUMMARY
[___ Month(s)] : [unfilled] month(s) [FreeTextEntry1] : HR good in nsr\par bp better, tolerating rx's\par  [With Me] : with me

## 2019-06-24 NOTE — PHYSICAL EXAM
[General Appearance - Well Developed] : well developed [Normal Appearance] : normal appearance [Well Groomed] : well groomed [General Appearance - Well Nourished] : well nourished [General Appearance - In No Acute Distress] : no acute distress [No Deformities] : no deformities [Normal Conjunctiva] : the conjunctiva exhibited no abnormalities [Normal Oral Mucosa] : normal oral mucosa [Eyelids - No Xanthelasma] : the eyelids demonstrated no xanthelasmas [No Oral Pallor] : no oral pallor [No Oral Cyanosis] : no oral cyanosis [Normal Jugular Venous A Waves Present] : normal jugular venous A waves present [Normal Jugular Venous V Waves Present] : normal jugular venous V waves present [No Jugular Venous Lauren A Waves] : no jugular venous lauren A waves [Exaggerated Use Of Accessory Muscles For Inspiration] : no accessory muscle use [Respiration, Rhythm And Depth] : normal respiratory rhythm and effort [Auscultation Breath Sounds / Voice Sounds] : lungs were clear to auscultation bilaterally [Heart Rate And Rhythm] : heart rate and rhythm were normal [Heart Sounds] : normal S1 and S2 [Abdomen Soft] : soft [Abdomen Tenderness] : non-tender [Abdomen Mass (___ Cm)] : no abdominal mass palpated [Abnormal Walk] : normal gait [Gait - Sufficient For Exercise Testing] : the gait was sufficient for exercise testing [Nail Clubbing] : no clubbing of the fingernails [Cyanosis, Localized] : no localized cyanosis [Petechial Hemorrhages (___cm)] : no petechial hemorrhages [Skin Color & Pigmentation] : normal skin color and pigmentation [] : no rash [Skin Lesions] : no skin lesions [No Venous Stasis] : no venous stasis [No Skin Ulcers] : no skin ulcer [No Xanthoma] : no  xanthoma was observed [Oriented To Time, Place, And Person] : oriented to person, place, and time [Affect] : the affect was normal [Mood] : the mood was normal [No Anxiety] : not feeling anxious [FreeTextEntry1] : 1-2/6 trevor

## 2019-06-24 NOTE — HISTORY OF PRESENT ILLNESS
[FreeTextEntry1] : BP mostly better - reviewed her recent readings\par still unsteady, uses walker\par now has 24 home care\par still fatigue but no cp or dyspnea\par \par

## 2019-07-03 ENCOUNTER — RX RENEWAL (OUTPATIENT)
Age: 84
End: 2019-07-03

## 2019-09-16 ENCOUNTER — LABORATORY RESULT (OUTPATIENT)
Age: 84
End: 2019-09-16

## 2019-09-16 ENCOUNTER — APPOINTMENT (OUTPATIENT)
Dept: HEART AND VASCULAR | Facility: CLINIC | Age: 84
End: 2019-09-16
Payer: MEDICARE

## 2019-09-16 VITALS
TEMPERATURE: 97.4 F | HEIGHT: 59 IN | OXYGEN SATURATION: 94 % | BODY MASS INDEX: 25.2 KG/M2 | WEIGHT: 125 LBS | DIASTOLIC BLOOD PRESSURE: 70 MMHG | HEART RATE: 63 BPM | SYSTOLIC BLOOD PRESSURE: 110 MMHG

## 2019-09-16 PROCEDURE — 99214 OFFICE O/P EST MOD 30 MIN: CPT

## 2019-09-16 PROCEDURE — 93000 ELECTROCARDIOGRAM COMPLETE: CPT

## 2019-09-16 NOTE — HISTORY OF PRESENT ILLNESS
[FreeTextEntry1] : BP mostly better - reviewed her recent readings\par still unsteady, uses walker\par now has 24 home care\par still fatigue but no cp or dyspnea\par seen by derm\par seen by ortho\par \par \par

## 2019-09-16 NOTE — PHYSICAL EXAM
[General Appearance - Well Developed] : well developed [Normal Appearance] : normal appearance [Well Groomed] : well groomed [General Appearance - Well Nourished] : well nourished [No Deformities] : no deformities [General Appearance - In No Acute Distress] : no acute distress [Normal Conjunctiva] : the conjunctiva exhibited no abnormalities [Eyelids - No Xanthelasma] : the eyelids demonstrated no xanthelasmas [Normal Oral Mucosa] : normal oral mucosa [No Oral Pallor] : no oral pallor [No Oral Cyanosis] : no oral cyanosis [Normal Jugular Venous A Waves Present] : normal jugular venous A waves present [Normal Jugular Venous V Waves Present] : normal jugular venous V waves present [No Jugular Venous Lauren A Waves] : no jugular venous lauern A waves [Respiration, Rhythm And Depth] : normal respiratory rhythm and effort [Exaggerated Use Of Accessory Muscles For Inspiration] : no accessory muscle use [Auscultation Breath Sounds / Voice Sounds] : lungs were clear to auscultation bilaterally [Heart Rate And Rhythm] : heart rate and rhythm were normal [Heart Sounds] : normal S1 and S2 [Abdomen Soft] : soft [Abdomen Tenderness] : non-tender [Abdomen Mass (___ Cm)] : no abdominal mass palpated [Abnormal Walk] : normal gait [Gait - Sufficient For Exercise Testing] : the gait was sufficient for exercise testing [Nail Clubbing] : no clubbing of the fingernails [Cyanosis, Localized] : no localized cyanosis [Petechial Hemorrhages (___cm)] : no petechial hemorrhages [Skin Color & Pigmentation] : normal skin color and pigmentation [] : no rash [No Venous Stasis] : no venous stasis [Skin Lesions] : no skin lesions [No Skin Ulcers] : no skin ulcer [No Xanthoma] : no  xanthoma was observed [Oriented To Time, Place, And Person] : oriented to person, place, and time [Affect] : the affect was normal [Mood] : the mood was normal [No Anxiety] : not feeling anxious [FreeTextEntry1] : 1-2/6 trevor

## 2019-09-17 LAB
ALBUMIN SERPL ELPH-MCNC: 4.3 G/DL
ALP BLD-CCNC: 61 U/L
ALT SERPL-CCNC: 13 U/L
ANION GAP SERPL CALC-SCNC: 12 MMOL/L
AST SERPL-CCNC: 17 U/L
BASOPHILS # BLD AUTO: 0 K/UL
BASOPHILS NFR BLD AUTO: 0 %
BILIRUB SERPL-MCNC: 0.5 MG/DL
BUN SERPL-MCNC: 23 MG/DL
CALCIUM SERPL-MCNC: 9.6 MG/DL
CHLORIDE SERPL-SCNC: 101 MMOL/L
CHOLEST SERPL-MCNC: 179 MG/DL
CHOLEST/HDLC SERPL: 2.7 RATIO
CO2 SERPL-SCNC: 27 MMOL/L
CREAT SERPL-MCNC: 0.69 MG/DL
EOSINOPHIL # BLD AUTO: 0.36 K/UL
EOSINOPHIL NFR BLD AUTO: 5.2 %
GLUCOSE SERPL-MCNC: 139 MG/DL
HCT VFR BLD CALC: 41.6 %
HDLC SERPL-MCNC: 66 MG/DL
HGB BLD-MCNC: 13 G/DL
LDLC SERPL CALC-MCNC: 103 MG/DL
LYMPHOCYTES # BLD AUTO: 0.97 K/UL
LYMPHOCYTES NFR BLD AUTO: 13.9 %
MAN DIFF?: NORMAL
MCHC RBC-ENTMCNC: 31.3 GM/DL
MCHC RBC-ENTMCNC: 33.9 PG
MCV RBC AUTO: 108.3 FL
MONOCYTES # BLD AUTO: 0.49 K/UL
MONOCYTES NFR BLD AUTO: 7 %
NEUTROPHILS # BLD AUTO: 5.12 K/UL
NEUTROPHILS NFR BLD AUTO: 73 %
PLATELET # BLD AUTO: 186 K/UL
POTASSIUM SERPL-SCNC: 4.8 MMOL/L
PROT SERPL-MCNC: 6.8 G/DL
RBC # BLD: 3.84 M/UL
RBC # FLD: 15.8 %
SODIUM SERPL-SCNC: 140 MMOL/L
TRIGL SERPL-MCNC: 50 MG/DL
TSH SERPL-ACNC: 2.72 UIU/ML
WBC # FLD AUTO: 7.01 K/UL

## 2019-09-18 LAB
ESTIMATED AVERAGE GLUCOSE: 128 MG/DL
HBA1C MFR BLD HPLC: 6.1 %

## 2019-09-26 ENCOUNTER — RX RENEWAL (OUTPATIENT)
Age: 84
End: 2019-09-26

## 2019-11-20 ENCOUNTER — APPOINTMENT (OUTPATIENT)
Dept: HEART AND VASCULAR | Facility: CLINIC | Age: 84
End: 2019-11-20
Payer: MEDICARE

## 2019-11-20 VITALS
BODY MASS INDEX: 25.4 KG/M2 | OXYGEN SATURATION: 94 % | RESPIRATION RATE: 14 BRPM | WEIGHT: 126 LBS | HEIGHT: 59 IN | HEART RATE: 66 BPM | DIASTOLIC BLOOD PRESSURE: 70 MMHG | SYSTOLIC BLOOD PRESSURE: 112 MMHG | TEMPERATURE: 98.3 F

## 2019-11-20 PROCEDURE — 99214 OFFICE O/P EST MOD 30 MIN: CPT

## 2019-11-20 NOTE — PHYSICAL EXAM
[General Appearance - Well Developed] : well developed [Normal Appearance] : normal appearance [Well Groomed] : well groomed [General Appearance - Well Nourished] : well nourished [No Deformities] : no deformities [General Appearance - In No Acute Distress] : no acute distress [Normal Conjunctiva] : the conjunctiva exhibited no abnormalities [Eyelids - No Xanthelasma] : the eyelids demonstrated no xanthelasmas [Normal Oral Mucosa] : normal oral mucosa [No Oral Pallor] : no oral pallor [No Oral Cyanosis] : no oral cyanosis [Normal Jugular Venous A Waves Present] : normal jugular venous A waves present [Normal Jugular Venous V Waves Present] : normal jugular venous V waves present [No Jugular Venous Lauren A Waves] : no jugular venous lauren A waves [Respiration, Rhythm And Depth] : normal respiratory rhythm and effort [Exaggerated Use Of Accessory Muscles For Inspiration] : no accessory muscle use [Auscultation Breath Sounds / Voice Sounds] : lungs were clear to auscultation bilaterally [Heart Rate And Rhythm] : heart rate and rhythm were normal [Heart Sounds] : normal S1 and S2 [Abdomen Soft] : soft [Abdomen Tenderness] : non-tender [Abdomen Mass (___ Cm)] : no abdominal mass palpated [Abnormal Walk] : normal gait [Nail Clubbing] : no clubbing of the fingernails [Gait - Sufficient For Exercise Testing] : the gait was sufficient for exercise testing [Petechial Hemorrhages (___cm)] : no petechial hemorrhages [Cyanosis, Localized] : no localized cyanosis [Skin Color & Pigmentation] : normal skin color and pigmentation [] : no rash [No Venous Stasis] : no venous stasis [Skin Lesions] : no skin lesions [No Skin Ulcers] : no skin ulcer [No Xanthoma] : no  xanthoma was observed [Oriented To Time, Place, And Person] : oriented to person, place, and time [Affect] : the affect was normal [No Anxiety] : not feeling anxious [Mood] : the mood was normal [FreeTextEntry1] : 1-2/6 trevor

## 2019-11-20 NOTE — HISTORY OF PRESENT ILLNESS
[FreeTextEntry1] : BP mostly better - reviewed her recent readings\par still unsteady, uses walker\par now has 24 home care\par still fatigue but no cp or dyspnea\par no new comps\par \par \par

## 2020-02-12 ENCOUNTER — APPOINTMENT (OUTPATIENT)
Dept: HEART AND VASCULAR | Facility: CLINIC | Age: 85
End: 2020-02-12
Payer: MEDICARE

## 2020-02-12 VITALS
WEIGHT: 126 LBS | BODY MASS INDEX: 25.4 KG/M2 | HEART RATE: 71 BPM | DIASTOLIC BLOOD PRESSURE: 66 MMHG | SYSTOLIC BLOOD PRESSURE: 108 MMHG | HEIGHT: 59 IN | TEMPERATURE: 97.7 F | OXYGEN SATURATION: 95 % | RESPIRATION RATE: 14 BRPM

## 2020-02-12 DIAGNOSIS — S72.002A FRACTURE OF UNSPECIFIED PART OF NECK OF LEFT FEMUR, INITIAL ENCOUNTER FOR CLOSED FRACTURE: ICD-10-CM

## 2020-02-12 PROCEDURE — 99215 OFFICE O/P EST HI 40 MIN: CPT

## 2020-02-12 PROCEDURE — 93000 ELECTROCARDIOGRAM COMPLETE: CPT

## 2020-02-12 NOTE — DISCUSSION/SUMMARY
[___ Month(s)] : [unfilled] month(s) [With Me] : with me [FreeTextEntry1] : sleep hygiene reveiwed\par bp ok\par pt/ot\par had IVC filter- will leave indef  \par currently wheelchair depd

## 2020-02-12 NOTE — PHYSICAL EXAM
[General Appearance - Well Developed] : well developed [Normal Appearance] : normal appearance [Well Groomed] : well groomed [No Deformities] : no deformities [General Appearance - Well Nourished] : well nourished [Normal Conjunctiva] : the conjunctiva exhibited no abnormalities [General Appearance - In No Acute Distress] : no acute distress [Eyelids - No Xanthelasma] : the eyelids demonstrated no xanthelasmas [Normal Oral Mucosa] : normal oral mucosa [No Oral Pallor] : no oral pallor [No Oral Cyanosis] : no oral cyanosis [Normal Jugular Venous A Waves Present] : normal jugular venous A waves present [Normal Jugular Venous V Waves Present] : normal jugular venous V waves present [No Jugular Venous Lauren A Waves] : no jugular venous lauren A waves [Exaggerated Use Of Accessory Muscles For Inspiration] : no accessory muscle use [Respiration, Rhythm And Depth] : normal respiratory rhythm and effort [Auscultation Breath Sounds / Voice Sounds] : lungs were clear to auscultation bilaterally [Heart Rate And Rhythm] : heart rate and rhythm were normal [Heart Sounds] : normal S1 and S2 [Abdomen Soft] : soft [Abdomen Tenderness] : non-tender [Abdomen Mass (___ Cm)] : no abdominal mass palpated [Gait - Sufficient For Exercise Testing] : the gait was sufficient for exercise testing [Nail Clubbing] : no clubbing of the fingernails [Abnormal Walk] : normal gait [Petechial Hemorrhages (___cm)] : no petechial hemorrhages [Cyanosis, Localized] : no localized cyanosis [] : no rash [No Venous Stasis] : no venous stasis [Skin Color & Pigmentation] : normal skin color and pigmentation [No Skin Ulcers] : no skin ulcer [Skin Lesions] : no skin lesions [Oriented To Time, Place, And Person] : oriented to person, place, and time [No Xanthoma] : no  xanthoma was observed [Affect] : the affect was normal [Mood] : the mood was normal [No Anxiety] : not feeling anxious [FreeTextEntry1] : 1-2/6 trevor

## 2020-02-12 NOTE — HISTORY OF PRESENT ILLNESS
[FreeTextEntry1] : fell at home dec 7- had partial hip replacemet\par then fell again in rehab and dislocated- reduced in hospt\par now home, in wheelchair w 24/7 aids\par BP mostly better - reviewed her recent readings\par still fatigue but no cp or dyspnea\par not sleeping well\par needs paperwork\par \par \par \par

## 2020-05-28 ENCOUNTER — APPOINTMENT (OUTPATIENT)
Dept: HEART AND VASCULAR | Facility: CLINIC | Age: 85
End: 2020-05-28
Payer: MEDICARE

## 2020-05-28 PROCEDURE — 99213 OFFICE O/P EST LOW 20 MIN: CPT | Mod: 95

## 2020-05-28 NOTE — HISTORY OF PRESENT ILLNESS
[Home] : at home, [unfilled] , at the time of the visit. [Medical Office: ([facility])___] : at the medical office located in  [Verbal consent obtained from patient] : the patient, [unfilled] [FreeTextEntry1] : doing very well\par isolating at home to prevent covid\par bp has been good\par ambulating w walker\par needs updated pneumovax\par \par \par \par \par

## 2020-05-29 ENCOUNTER — APPOINTMENT (OUTPATIENT)
Dept: HEART AND VASCULAR | Facility: CLINIC | Age: 85
End: 2020-05-29

## 2020-06-11 ENCOUNTER — APPOINTMENT (OUTPATIENT)
Dept: HEART AND VASCULAR | Facility: CLINIC | Age: 85
End: 2020-06-11
Payer: MEDICARE

## 2020-06-11 VITALS
RESPIRATION RATE: 14 BRPM | WEIGHT: 126 LBS | SYSTOLIC BLOOD PRESSURE: 110 MMHG | HEART RATE: 70 BPM | HEIGHT: 59 IN | OXYGEN SATURATION: 96 % | TEMPERATURE: 97.6 F | DIASTOLIC BLOOD PRESSURE: 70 MMHG | BODY MASS INDEX: 25.4 KG/M2

## 2020-06-11 PROCEDURE — 36415 COLL VENOUS BLD VENIPUNCTURE: CPT

## 2020-06-11 PROCEDURE — 99214 OFFICE O/P EST MOD 30 MIN: CPT

## 2020-06-11 NOTE — PHYSICAL EXAM
[General Appearance - Well Developed] : well developed [Well Groomed] : well groomed [Normal Appearance] : normal appearance [No Deformities] : no deformities [General Appearance - Well Nourished] : well nourished [General Appearance - In No Acute Distress] : no acute distress [Normal Conjunctiva] : the conjunctiva exhibited no abnormalities [Normal Oral Mucosa] : normal oral mucosa [Eyelids - No Xanthelasma] : the eyelids demonstrated no xanthelasmas [No Oral Cyanosis] : no oral cyanosis [No Oral Pallor] : no oral pallor [Normal Jugular Venous V Waves Present] : normal jugular venous V waves present [Normal Jugular Venous A Waves Present] : normal jugular venous A waves present [No Jugular Venous Lauren A Waves] : no jugular venous lauren A waves [Respiration, Rhythm And Depth] : normal respiratory rhythm and effort [Exaggerated Use Of Accessory Muscles For Inspiration] : no accessory muscle use [Auscultation Breath Sounds / Voice Sounds] : lungs were clear to auscultation bilaterally [Heart Rate And Rhythm] : heart rate and rhythm were normal [Heart Sounds] : normal S1 and S2 [Abdomen Soft] : soft [Abdomen Tenderness] : non-tender [Abnormal Walk] : normal gait [Abdomen Mass (___ Cm)] : no abdominal mass palpated [Gait - Sufficient For Exercise Testing] : the gait was sufficient for exercise testing [Nail Clubbing] : no clubbing of the fingernails [Cyanosis, Localized] : no localized cyanosis [Petechial Hemorrhages (___cm)] : no petechial hemorrhages [] : no rash [Skin Color & Pigmentation] : normal skin color and pigmentation [No Venous Stasis] : no venous stasis [Skin Lesions] : no skin lesions [No Xanthoma] : no  xanthoma was observed [No Skin Ulcers] : no skin ulcer [Oriented To Time, Place, And Person] : oriented to person, place, and time [Mood] : the mood was normal [Affect] : the affect was normal [No Anxiety] : not feeling anxious [FreeTextEntry1] : 1-2/6 trevor

## 2020-06-11 NOTE — HISTORY OF PRESENT ILLNESS
[FreeTextEntry1] : doing very well\par isolating at home to prevent covid\par bp has been good\par ambulating w walker\par needs updated pneumovax\par \par \par \par \par

## 2020-06-12 LAB
ALBUMIN SERPL ELPH-MCNC: 4.3 G/DL
ALP BLD-CCNC: 73 U/L
ALT SERPL-CCNC: 16 U/L
ANION GAP SERPL CALC-SCNC: 14 MMOL/L
AST SERPL-CCNC: 19 U/L
BASOPHILS # BLD AUTO: 0.03 K/UL
BASOPHILS NFR BLD AUTO: 0.4 %
BILIRUB SERPL-MCNC: 0.4 MG/DL
BUN SERPL-MCNC: 21 MG/DL
CALCIUM SERPL-MCNC: 9.6 MG/DL
CHLORIDE SERPL-SCNC: 102 MMOL/L
CHOLEST SERPL-MCNC: 175 MG/DL
CHOLEST/HDLC SERPL: 2.9 RATIO
CO2 SERPL-SCNC: 24 MMOL/L
CREAT SERPL-MCNC: 0.59 MG/DL
EOSINOPHIL # BLD AUTO: 0.06 K/UL
EOSINOPHIL NFR BLD AUTO: 0.9 %
ESTIMATED AVERAGE GLUCOSE: 126 MG/DL
GLUCOSE SERPL-MCNC: 116 MG/DL
HBA1C MFR BLD HPLC: 6 %
HCT VFR BLD CALC: 40.2 %
HDLC SERPL-MCNC: 61 MG/DL
HGB BLD-MCNC: 12.5 G/DL
IMM GRANULOCYTES NFR BLD AUTO: 0.3 %
LDLC SERPL CALC-MCNC: 103 MG/DL
LYMPHOCYTES # BLD AUTO: 1.09 K/UL
LYMPHOCYTES NFR BLD AUTO: 15.8 %
MAN DIFF?: NORMAL
MCHC RBC-ENTMCNC: 31.1 GM/DL
MCHC RBC-ENTMCNC: 31.6 PG
MCV RBC AUTO: 101.8 FL
MONOCYTES # BLD AUTO: 0.86 K/UL
MONOCYTES NFR BLD AUTO: 12.4 %
NEUTROPHILS # BLD AUTO: 4.86 K/UL
NEUTROPHILS NFR BLD AUTO: 70.2 %
PLATELET # BLD AUTO: 193 K/UL
POTASSIUM SERPL-SCNC: 4.3 MMOL/L
PROT SERPL-MCNC: 6.6 G/DL
RBC # BLD: 3.95 M/UL
RBC # FLD: 16.8 %
SARS-COV-2 IGG SERPL IA-ACNC: <0.1 INDEX
SARS-COV-2 IGG SERPL QL IA: NEGATIVE
SODIUM SERPL-SCNC: 140 MMOL/L
TRIGL SERPL-MCNC: 55 MG/DL
TSH SERPL-ACNC: 1.06 UIU/ML
WBC # FLD AUTO: 6.92 K/UL

## 2020-08-06 ENCOUNTER — APPOINTMENT (OUTPATIENT)
Dept: HEART AND VASCULAR | Facility: CLINIC | Age: 85
End: 2020-08-06
Payer: MEDICARE

## 2020-08-06 ENCOUNTER — LABORATORY RESULT (OUTPATIENT)
Age: 85
End: 2020-08-06

## 2020-08-06 VITALS
OXYGEN SATURATION: 96 % | WEIGHT: 126 LBS | HEART RATE: 58 BPM | HEIGHT: 59 IN | BODY MASS INDEX: 25.4 KG/M2 | SYSTOLIC BLOOD PRESSURE: 108 MMHG | TEMPERATURE: 97.6 F | DIASTOLIC BLOOD PRESSURE: 66 MMHG

## 2020-08-06 DIAGNOSIS — Z01.818 ENCOUNTER FOR OTHER PREPROCEDURAL EXAMINATION: ICD-10-CM

## 2020-08-06 PROCEDURE — 93000 ELECTROCARDIOGRAM COMPLETE: CPT | Mod: NC

## 2020-08-06 PROCEDURE — 36415 COLL VENOUS BLD VENIPUNCTURE: CPT

## 2020-08-06 PROCEDURE — 99214 OFFICE O/P EST MOD 30 MIN: CPT

## 2020-08-06 NOTE — PHYSICAL EXAM
[General Appearance - Well Developed] : well developed [Normal Appearance] : normal appearance [Well Groomed] : well groomed [General Appearance - In No Acute Distress] : no acute distress [General Appearance - Well Nourished] : well nourished [No Deformities] : no deformities [Normal Conjunctiva] : the conjunctiva exhibited no abnormalities [Eyelids - No Xanthelasma] : the eyelids demonstrated no xanthelasmas [Normal Oral Mucosa] : normal oral mucosa [No Oral Cyanosis] : no oral cyanosis [No Oral Pallor] : no oral pallor [Normal Jugular Venous A Waves Present] : normal jugular venous A waves present [Normal Jugular Venous V Waves Present] : normal jugular venous V waves present [No Jugular Venous Lauren A Waves] : no jugular venous lauren A waves [Exaggerated Use Of Accessory Muscles For Inspiration] : no accessory muscle use [Respiration, Rhythm And Depth] : normal respiratory rhythm and effort [Auscultation Breath Sounds / Voice Sounds] : lungs were clear to auscultation bilaterally [Heart Sounds] : normal S1 and S2 [Heart Rate And Rhythm] : heart rate and rhythm were normal [Abdomen Tenderness] : non-tender [Abdomen Soft] : soft [Abdomen Mass (___ Cm)] : no abdominal mass palpated [Abnormal Walk] : normal gait [Gait - Sufficient For Exercise Testing] : the gait was sufficient for exercise testing [Cyanosis, Localized] : no localized cyanosis [Nail Clubbing] : no clubbing of the fingernails [Petechial Hemorrhages (___cm)] : no petechial hemorrhages [] : no rash [Skin Color & Pigmentation] : normal skin color and pigmentation [No Venous Stasis] : no venous stasis [No Skin Ulcers] : no skin ulcer [Skin Lesions] : no skin lesions [Oriented To Time, Place, And Person] : oriented to person, place, and time [No Xanthoma] : no  xanthoma was observed [Affect] : the affect was normal [No Anxiety] : not feeling anxious [Mood] : the mood was normal [FreeTextEntry1] : 1-2/6 trevor

## 2020-08-06 NOTE — HISTORY OF PRESENT ILLNESS
[FreeTextEntry1] : pre-op for eye surgery next week\par doing very well\par isolating at home to prevent covid\par bp has been good\par ambulating w walker\par no chest pain, dyspnea or palpitations or edema\par \par \par \par \par \par

## 2020-08-07 LAB
ALBUMIN SERPL ELPH-MCNC: 4.5 G/DL
ALP BLD-CCNC: 67 U/L
ALT SERPL-CCNC: 19 U/L
ANION GAP SERPL CALC-SCNC: 20 MMOL/L
APTT BLD: 29.9 SEC
AST SERPL-CCNC: 20 U/L
BASOPHILS # BLD AUTO: 0.02 K/UL
BASOPHILS NFR BLD AUTO: 0.2 %
BILIRUB SERPL-MCNC: 0.4 MG/DL
BUN SERPL-MCNC: 26 MG/DL
CALCIUM SERPL-MCNC: 9.9 MG/DL
CHLORIDE SERPL-SCNC: 101 MMOL/L
CHOLEST SERPL-MCNC: 201 MG/DL
CHOLEST/HDLC SERPL: 2.9 RATIO
CO2 SERPL-SCNC: 19 MMOL/L
CREAT SERPL-MCNC: 0.69 MG/DL
EOSINOPHIL # BLD AUTO: 0.02 K/UL
EOSINOPHIL NFR BLD AUTO: 0.2 %
ESTIMATED AVERAGE GLUCOSE: 128 MG/DL
GLUCOSE SERPL-MCNC: 121 MG/DL
HBA1C MFR BLD HPLC: 6.1 %
HCT VFR BLD CALC: 42.9 %
HDLC SERPL-MCNC: 69 MG/DL
HGB BLD-MCNC: 13.1 G/DL
IMM GRANULOCYTES NFR BLD AUTO: 0.4 %
INR PPP: 1.07 RATIO
LDLC SERPL CALC-MCNC: 121 MG/DL
LYMPHOCYTES # BLD AUTO: 1.21 K/UL
LYMPHOCYTES NFR BLD AUTO: 9.8 %
MAN DIFF?: NORMAL
MCHC RBC-ENTMCNC: 30.5 GM/DL
MCHC RBC-ENTMCNC: 32.8 PG
MCV RBC AUTO: 107.5 FL
MONOCYTES # BLD AUTO: 1.96 K/UL
MONOCYTES NFR BLD AUTO: 15.8 %
NEUTROPHILS # BLD AUTO: 9.11 K/UL
NEUTROPHILS NFR BLD AUTO: 73.6 %
PLATELET # BLD AUTO: 191 K/UL
POTASSIUM SERPL-SCNC: 4.5 MMOL/L
PROT SERPL-MCNC: 6.5 G/DL
PT BLD: 12.7 SEC
RBC # BLD: 3.99 M/UL
RBC # FLD: 16.3 %
SARS-COV-2 IGG SERPL IA-ACNC: 0.08 INDEX
SARS-COV-2 IGG SERPL QL IA: NEGATIVE
SODIUM SERPL-SCNC: 139 MMOL/L
TRIGL SERPL-MCNC: 54 MG/DL
TSH SERPL-ACNC: 0.56 UIU/ML
WBC # FLD AUTO: 12.37 K/UL

## 2020-09-23 ENCOUNTER — APPOINTMENT (OUTPATIENT)
Dept: HEART AND VASCULAR | Facility: CLINIC | Age: 85
End: 2020-09-23
Payer: MEDICARE

## 2020-09-23 VITALS
HEART RATE: 60 BPM | SYSTOLIC BLOOD PRESSURE: 110 MMHG | BODY MASS INDEX: 25.4 KG/M2 | DIASTOLIC BLOOD PRESSURE: 70 MMHG | TEMPERATURE: 97.6 F | WEIGHT: 126 LBS | RESPIRATION RATE: 14 BRPM | OXYGEN SATURATION: 96 % | HEIGHT: 59 IN

## 2020-09-23 DIAGNOSIS — Z23 ENCOUNTER FOR IMMUNIZATION: ICD-10-CM

## 2020-09-23 PROCEDURE — 99214 OFFICE O/P EST MOD 30 MIN: CPT

## 2020-09-23 PROCEDURE — 90662 IIV NO PRSV INCREASED AG IM: CPT

## 2020-09-23 PROCEDURE — G0008: CPT

## 2020-09-23 NOTE — HISTORY OF PRESENT ILLNESS
[FreeTextEntry1] : no complications from surgery\par doing very well\par isolating at home to prevent covid\par bp has been good\par ambulating w walker\par no chest pain, dyspnea or palpitations or edema\par \par \par \par \par \par

## 2020-10-26 ENCOUNTER — RX RENEWAL (OUTPATIENT)
Age: 85
End: 2020-10-26

## 2021-01-25 ENCOUNTER — RX RENEWAL (OUTPATIENT)
Age: 86
End: 2021-01-25

## 2021-02-24 ENCOUNTER — RX RENEWAL (OUTPATIENT)
Age: 86
End: 2021-02-24

## 2021-03-30 ENCOUNTER — LABORATORY RESULT (OUTPATIENT)
Age: 86
End: 2021-03-30

## 2021-03-30 ENCOUNTER — APPOINTMENT (OUTPATIENT)
Dept: HEART AND VASCULAR | Facility: CLINIC | Age: 86
End: 2021-03-30
Payer: MEDICARE

## 2021-03-30 VITALS
HEART RATE: 74 BPM | WEIGHT: 131 LBS | HEIGHT: 59 IN | OXYGEN SATURATION: 96 % | DIASTOLIC BLOOD PRESSURE: 70 MMHG | RESPIRATION RATE: 14 BRPM | TEMPERATURE: 97.6 F | BODY MASS INDEX: 26.41 KG/M2 | SYSTOLIC BLOOD PRESSURE: 108 MMHG

## 2021-03-30 DIAGNOSIS — Z00.00 ENCOUNTER FOR GENERAL ADULT MEDICAL EXAMINATION W/OUT ABNORMAL FINDINGS: ICD-10-CM

## 2021-03-30 PROCEDURE — 93000 ELECTROCARDIOGRAM COMPLETE: CPT

## 2021-03-30 PROCEDURE — 36415 COLL VENOUS BLD VENIPUNCTURE: CPT

## 2021-03-30 PROCEDURE — G0439: CPT

## 2021-03-30 NOTE — DISCUSSION/SUMMARY
[FreeTextEntry1] : restart PT\par check labs\par still needs 24/7 help- dementia and unsteady gait\par bp reasonable\par got covid vax

## 2021-03-30 NOTE — HISTORY OF PRESENT ILLNESS
[FreeTextEntry1] : for annual\par doing very well\par dementia is stable- has 24/7 help at home\par got covid vax x 2\par bp has been good\par ambulating w walker and assistance\par no chest pain, dyspnea or palpitations or edema\par \par \par \par \par \par

## 2021-03-30 NOTE — PHYSICAL EXAM
[General Appearance - Well Developed] : well developed [Normal Appearance] : normal appearance [Well Groomed] : well groomed [General Appearance - Well Nourished] : well nourished [No Deformities] : no deformities [General Appearance - In No Acute Distress] : no acute distress [Normal Conjunctiva] : the conjunctiva exhibited no abnormalities [Eyelids - No Xanthelasma] : the eyelids demonstrated no xanthelasmas [Normal Oral Mucosa] : normal oral mucosa [No Oral Pallor] : no oral pallor [No Oral Cyanosis] : no oral cyanosis [Normal Jugular Venous A Waves Present] : normal jugular venous A waves present [Normal Jugular Venous V Waves Present] : normal jugular venous V waves present [No Jugular Venous Lauren A Waves] : no jugular venous lauren A waves [Respiration, Rhythm And Depth] : normal respiratory rhythm and effort [Exaggerated Use Of Accessory Muscles For Inspiration] : no accessory muscle use [Auscultation Breath Sounds / Voice Sounds] : lungs were clear to auscultation bilaterally [Heart Rate And Rhythm] : heart rate and rhythm were normal [Heart Sounds] : normal S1 and S2 [Abdomen Soft] : soft [Abdomen Tenderness] : non-tender [Abdomen Mass (___ Cm)] : no abdominal mass palpated [Nail Clubbing] : no clubbing of the fingernails [Cyanosis, Localized] : no localized cyanosis [Petechial Hemorrhages (___cm)] : no petechial hemorrhages [Skin Color & Pigmentation] : normal skin color and pigmentation [] : no rash [No Venous Stasis] : no venous stasis [Skin Lesions] : no skin lesions [No Skin Ulcers] : no skin ulcer [No Xanthoma] : no  xanthoma was observed [Oriented To Time, Place, And Person] : oriented to person, place, and time [Mood] : the mood was normal [Affect] : the affect was normal [No Anxiety] : not feeling anxious [FreeTextEntry1] : depd on walker

## 2021-03-31 ENCOUNTER — RX RENEWAL (OUTPATIENT)
Age: 86
End: 2021-03-31

## 2021-03-31 LAB
ALBUMIN SERPL ELPH-MCNC: 4.4 G/DL
ALP BLD-CCNC: 78 U/L
ALT SERPL-CCNC: 24 U/L
ANION GAP SERPL CALC-SCNC: 23 MMOL/L
AST SERPL-CCNC: 31 U/L
BASOPHILS # BLD AUTO: 0.05 K/UL
BASOPHILS NFR BLD AUTO: 0.6 %
BILIRUB SERPL-MCNC: 0.4 MG/DL
BUN SERPL-MCNC: 25 MG/DL
CALCIUM SERPL-MCNC: 9.7 MG/DL
CHLORIDE SERPL-SCNC: 103 MMOL/L
CHOLEST SERPL-MCNC: 210 MG/DL
CO2 SERPL-SCNC: 15 MMOL/L
CREAT SERPL-MCNC: 0.69 MG/DL
EOSINOPHIL # BLD AUTO: 0.13 K/UL
EOSINOPHIL NFR BLD AUTO: 1.6 %
ESTIMATED AVERAGE GLUCOSE: 134 MG/DL
GLUCOSE SERPL-MCNC: 103 MG/DL
HBA1C MFR BLD HPLC: 6.3 %
HCT VFR BLD CALC: 43.7 %
HDLC SERPL-MCNC: 66 MG/DL
HGB BLD-MCNC: 13.9 G/DL
IMM GRANULOCYTES NFR BLD AUTO: 0.5 %
LDLC SERPL CALC-MCNC: 125 MG/DL
LYMPHOCYTES # BLD AUTO: 1.26 K/UL
LYMPHOCYTES NFR BLD AUTO: 16 %
MAN DIFF?: NORMAL
MCHC RBC-ENTMCNC: 31.8 GM/DL
MCHC RBC-ENTMCNC: 34 PG
MCV RBC AUTO: 106.8 FL
MONOCYTES # BLD AUTO: 1.13 K/UL
MONOCYTES NFR BLD AUTO: 14.3 %
NEUTROPHILS # BLD AUTO: 5.27 K/UL
NEUTROPHILS NFR BLD AUTO: 67 %
NONHDLC SERPL-MCNC: 144 MG/DL
PLATELET # BLD AUTO: 218 K/UL
POTASSIUM SERPL-SCNC: 5.5 MMOL/L
PROT SERPL-MCNC: 6.5 G/DL
RBC # BLD: 4.09 M/UL
RBC # FLD: 14.9 %
SODIUM SERPL-SCNC: 141 MMOL/L
TRIGL SERPL-MCNC: 96 MG/DL
TSH SERPL-ACNC: 1.42 UIU/ML
WBC # FLD AUTO: 7.88 K/UL

## 2021-04-19 ENCOUNTER — RX RENEWAL (OUTPATIENT)
Age: 86
End: 2021-04-19

## 2021-07-13 ENCOUNTER — APPOINTMENT (OUTPATIENT)
Dept: HEART AND VASCULAR | Facility: CLINIC | Age: 86
End: 2021-07-13
Payer: MEDICARE

## 2021-07-13 VITALS
HEIGHT: 59 IN | OXYGEN SATURATION: 98 % | BODY MASS INDEX: 26.41 KG/M2 | HEART RATE: 65 BPM | WEIGHT: 131 LBS | RESPIRATION RATE: 14 BRPM | DIASTOLIC BLOOD PRESSURE: 78 MMHG | TEMPERATURE: 97 F | SYSTOLIC BLOOD PRESSURE: 112 MMHG

## 2021-07-13 PROCEDURE — 99214 OFFICE O/P EST MOD 30 MIN: CPT

## 2021-07-13 PROCEDURE — 36415 COLL VENOUS BLD VENIPUNCTURE: CPT

## 2021-07-13 PROCEDURE — G0009: CPT

## 2021-07-13 PROCEDURE — 90670 PCV13 VACCINE IM: CPT

## 2021-07-13 PROCEDURE — 93000 ELECTROCARDIOGRAM COMPLETE: CPT

## 2021-07-13 NOTE — HISTORY OF PRESENT ILLNESS
[FreeTextEntry1] : f/u htn, paf\par doing very well\par dementia is stable- has 24/7 help at home\par got covid vax x 2\par bp has been good- ambi readings reviewed\par ambulating w walker and assistance\par no chest pain, dyspnea or palpitations or edema\par \par \par \par \par \par

## 2021-07-14 LAB
ALBUMIN SERPL ELPH-MCNC: 4.4 G/DL
ALP BLD-CCNC: 73 U/L
ALT SERPL-CCNC: 15 U/L
ANION GAP SERPL CALC-SCNC: 10 MMOL/L
AST SERPL-CCNC: 19 U/L
BASOPHILS # BLD AUTO: 0.07 K/UL
BASOPHILS NFR BLD AUTO: 0.8 %
BILIRUB SERPL-MCNC: 0.4 MG/DL
BUN SERPL-MCNC: 23 MG/DL
CALCIUM SERPL-MCNC: 9.8 MG/DL
CHLORIDE SERPL-SCNC: 100 MMOL/L
CHOLEST SERPL-MCNC: 214 MG/DL
CO2 SERPL-SCNC: 29 MMOL/L
CREAT SERPL-MCNC: 0.75 MG/DL
EOSINOPHIL # BLD AUTO: 0.12 K/UL
EOSINOPHIL NFR BLD AUTO: 1.4 %
ESTIMATED AVERAGE GLUCOSE: 134 MG/DL
GLUCOSE SERPL-MCNC: 119 MG/DL
HBA1C MFR BLD HPLC: 6.3 %
HCT VFR BLD CALC: 44.4 %
HDLC SERPL-MCNC: 73 MG/DL
HGB BLD-MCNC: 14 G/DL
IMM GRANULOCYTES NFR BLD AUTO: 0.5 %
LDLC SERPL CALC-MCNC: 128 MG/DL
LYMPHOCYTES # BLD AUTO: 1.38 K/UL
LYMPHOCYTES NFR BLD AUTO: 16 %
MAN DIFF?: NORMAL
MCHC RBC-ENTMCNC: 31.5 GM/DL
MCHC RBC-ENTMCNC: 33.2 PG
MCV RBC AUTO: 105.2 FL
MONOCYTES # BLD AUTO: 1.21 K/UL
MONOCYTES NFR BLD AUTO: 14 %
NEUTROPHILS # BLD AUTO: 5.8 K/UL
NEUTROPHILS NFR BLD AUTO: 67.3 %
NONHDLC SERPL-MCNC: 141 MG/DL
PLATELET # BLD AUTO: 227 K/UL
POTASSIUM SERPL-SCNC: 4.8 MMOL/L
PROT SERPL-MCNC: 6.9 G/DL
RBC # BLD: 4.22 M/UL
RBC # FLD: 14.8 %
SODIUM SERPL-SCNC: 139 MMOL/L
TRIGL SERPL-MCNC: 62 MG/DL
TSH SERPL-ACNC: 1.53 UIU/ML
WBC # FLD AUTO: 8.62 K/UL

## 2021-07-26 ENCOUNTER — RX RENEWAL (OUTPATIENT)
Age: 86
End: 2021-07-26

## 2021-10-11 ENCOUNTER — RX RENEWAL (OUTPATIENT)
Age: 86
End: 2021-10-11

## 2021-10-26 ENCOUNTER — APPOINTMENT (OUTPATIENT)
Dept: HEART AND VASCULAR | Facility: CLINIC | Age: 86
End: 2021-10-26
Payer: MEDICARE

## 2021-10-26 VITALS
HEIGHT: 59 IN | BODY MASS INDEX: 27.82 KG/M2 | RESPIRATION RATE: 14 BRPM | TEMPERATURE: 98 F | OXYGEN SATURATION: 98 % | HEART RATE: 64 BPM | SYSTOLIC BLOOD PRESSURE: 138 MMHG | DIASTOLIC BLOOD PRESSURE: 70 MMHG | WEIGHT: 138 LBS

## 2021-10-26 PROCEDURE — 99214 OFFICE O/P EST MOD 30 MIN: CPT

## 2021-10-26 PROCEDURE — 93000 ELECTROCARDIOGRAM COMPLETE: CPT

## 2021-10-26 PROCEDURE — 36415 COLL VENOUS BLD VENIPUNCTURE: CPT

## 2021-10-26 NOTE — DISCUSSION/SUMMARY
[FreeTextEntry1] : has ortho f/u appt\par referred speech and swallow eval\par fluvax by md\par will get covid # 3

## 2021-10-26 NOTE — HISTORY OF PRESENT ILLNESS
[FreeTextEntry1] : f/u htn, paf\par doing very well\par dementia is stable- has 24/7 help at home\par got covid vax x 2\par bp has been good- ambi readings reviewed\par ambulating w walker and assistance\par no chest pain, dyspnea or palpitations or edema\par only comp is difficulty swallowing w both solids and liquids\par increased pain left knee, keeping her from walkin\par \par \par \par \par \par

## 2021-10-27 LAB
ALBUMIN SERPL ELPH-MCNC: 4.1 G/DL
ALP BLD-CCNC: 73 U/L
ALT SERPL-CCNC: 17 U/L
ANION GAP SERPL CALC-SCNC: 12 MMOL/L
AST SERPL-CCNC: 21 U/L
BASOPHILS # BLD AUTO: 0.05 K/UL
BASOPHILS NFR BLD AUTO: 0.6 %
BILIRUB SERPL-MCNC: 0.4 MG/DL
BUN SERPL-MCNC: 22 MG/DL
CALCIUM SERPL-MCNC: 9.3 MG/DL
CHLORIDE SERPL-SCNC: 103 MMOL/L
CHOLEST SERPL-MCNC: 174 MG/DL
CO2 SERPL-SCNC: 25 MMOL/L
CREAT SERPL-MCNC: 0.53 MG/DL
EOSINOPHIL # BLD AUTO: 0.13 K/UL
EOSINOPHIL NFR BLD AUTO: 1.7 %
ESTIMATED AVERAGE GLUCOSE: 128 MG/DL
GLUCOSE SERPL-MCNC: 137 MG/DL
HBA1C MFR BLD HPLC: 6.1 %
HCT VFR BLD CALC: 40.1 %
HDLC SERPL-MCNC: 54 MG/DL
HGB BLD-MCNC: 12.8 G/DL
IMM GRANULOCYTES NFR BLD AUTO: 0.4 %
LDLC SERPL CALC-MCNC: 106 MG/DL
LYMPHOCYTES # BLD AUTO: 1.16 K/UL
LYMPHOCYTES NFR BLD AUTO: 14.8 %
MAN DIFF?: NORMAL
MCHC RBC-ENTMCNC: 31.9 GM/DL
MCHC RBC-ENTMCNC: 33.2 PG
MCV RBC AUTO: 103.9 FL
MONOCYTES # BLD AUTO: 1.14 K/UL
MONOCYTES NFR BLD AUTO: 14.6 %
NEUTROPHILS # BLD AUTO: 5.32 K/UL
NEUTROPHILS NFR BLD AUTO: 67.9 %
NONHDLC SERPL-MCNC: 120 MG/DL
PLATELET # BLD AUTO: 240 K/UL
POTASSIUM SERPL-SCNC: 4.8 MMOL/L
PROT SERPL-MCNC: 6.3 G/DL
RBC # BLD: 3.86 M/UL
RBC # FLD: 14.9 %
SODIUM SERPL-SCNC: 139 MMOL/L
TRIGL SERPL-MCNC: 69 MG/DL
WBC # FLD AUTO: 7.83 K/UL

## 2021-11-01 ENCOUNTER — RX RENEWAL (OUTPATIENT)
Age: 86
End: 2021-11-01

## 2021-11-05 ENCOUNTER — RX RENEWAL (OUTPATIENT)
Age: 86
End: 2021-11-05

## 2021-12-20 ENCOUNTER — NON-APPOINTMENT (OUTPATIENT)
Age: 86
End: 2021-12-20

## 2022-01-05 ENCOUNTER — APPOINTMENT (OUTPATIENT)
Dept: HEART AND VASCULAR | Facility: CLINIC | Age: 87
End: 2022-01-05
Payer: MEDICARE

## 2022-01-05 VITALS
HEART RATE: 63 BPM | HEIGHT: 59 IN | TEMPERATURE: 97 F | SYSTOLIC BLOOD PRESSURE: 120 MMHG | DIASTOLIC BLOOD PRESSURE: 70 MMHG | WEIGHT: 138 LBS | OXYGEN SATURATION: 97 % | BODY MASS INDEX: 27.82 KG/M2

## 2022-01-05 DIAGNOSIS — I10 ESSENTIAL (PRIMARY) HYPERTENSION: ICD-10-CM

## 2022-01-05 PROCEDURE — 36415 COLL VENOUS BLD VENIPUNCTURE: CPT

## 2022-01-05 PROCEDURE — 99214 OFFICE O/P EST MOD 30 MIN: CPT

## 2022-01-05 NOTE — HISTORY OF PRESENT ILLNESS
[FreeTextEntry1] : f/u htn, paf\par doing very well\par recovered form recent URI\par dementia is stable- has 24/7 help at home\par got covid vax x 3\par bp has been good- ambi readings reviewed\par ambulating w walker and assistance\par no chest pain, dyspnea or palpitations or edema\par main comp is knee pain\par \par \par \par \par \par

## 2022-01-06 LAB
ALBUMIN SERPL ELPH-MCNC: 3.9 G/DL
ALP BLD-CCNC: 75 U/L
ALT SERPL-CCNC: 18 U/L
ANION GAP SERPL CALC-SCNC: 12 MMOL/L
AST SERPL-CCNC: 21 U/L
BASOPHILS # BLD AUTO: 0.04 K/UL
BASOPHILS NFR BLD AUTO: 0.5 %
BILIRUB SERPL-MCNC: 0.4 MG/DL
BUN SERPL-MCNC: 17 MG/DL
CALCIUM SERPL-MCNC: 9.2 MG/DL
CHLORIDE SERPL-SCNC: 101 MMOL/L
CHOLEST SERPL-MCNC: 189 MG/DL
CO2 SERPL-SCNC: 26 MMOL/L
CREAT SERPL-MCNC: 0.53 MG/DL
EOSINOPHIL # BLD AUTO: 0.1 K/UL
EOSINOPHIL NFR BLD AUTO: 1.3 %
ESTIMATED AVERAGE GLUCOSE: 134 MG/DL
GLUCOSE SERPL-MCNC: 104 MG/DL
HBA1C MFR BLD HPLC: 6.3 %
HCT VFR BLD CALC: 40.8 %
HDLC SERPL-MCNC: 55 MG/DL
HGB BLD-MCNC: 12.9 G/DL
IMM GRANULOCYTES NFR BLD AUTO: 0.5 %
LDLC SERPL CALC-MCNC: 120 MG/DL
LYMPHOCYTES # BLD AUTO: 1.18 K/UL
LYMPHOCYTES NFR BLD AUTO: 15.6 %
MAN DIFF?: NORMAL
MCHC RBC-ENTMCNC: 31.6 GM/DL
MCHC RBC-ENTMCNC: 33.1 PG
MCV RBC AUTO: 104.6 FL
MONOCYTES # BLD AUTO: 1.01 K/UL
MONOCYTES NFR BLD AUTO: 13.4 %
NEUTROPHILS # BLD AUTO: 5.17 K/UL
NEUTROPHILS NFR BLD AUTO: 68.7 %
NONHDLC SERPL-MCNC: 134 MG/DL
PLATELET # BLD AUTO: 244 K/UL
POTASSIUM SERPL-SCNC: 4.6 MMOL/L
PROT SERPL-MCNC: 6.5 G/DL
RBC # BLD: 3.9 M/UL
RBC # FLD: 14.5 %
SODIUM SERPL-SCNC: 139 MMOL/L
TRIGL SERPL-MCNC: 72 MG/DL
WBC # FLD AUTO: 7.54 K/UL

## 2022-01-11 ENCOUNTER — RX RENEWAL (OUTPATIENT)
Age: 87
End: 2022-01-11

## 2022-02-01 ENCOUNTER — RX RENEWAL (OUTPATIENT)
Age: 87
End: 2022-02-01

## 2022-03-28 ENCOUNTER — RX RENEWAL (OUTPATIENT)
Age: 87
End: 2022-03-28

## 2022-04-12 ENCOUNTER — APPOINTMENT (OUTPATIENT)
Dept: HEART AND VASCULAR | Facility: CLINIC | Age: 87
End: 2022-04-12
Payer: MEDICARE

## 2022-04-12 VITALS
SYSTOLIC BLOOD PRESSURE: 118 MMHG | DIASTOLIC BLOOD PRESSURE: 70 MMHG | TEMPERATURE: 98.6 F | RESPIRATION RATE: 14 BRPM | HEIGHT: 59 IN | OXYGEN SATURATION: 97 % | HEART RATE: 64 BPM

## 2022-04-12 DIAGNOSIS — I48.91 UNSPECIFIED ATRIAL FIBRILLATION: ICD-10-CM

## 2022-04-12 PROCEDURE — 99213 OFFICE O/P EST LOW 20 MIN: CPT

## 2022-04-13 NOTE — HISTORY OF PRESENT ILLNESS
[FreeTextEntry1] : f/u htn, paf\par doing very well\par recovered form recent URI\par dementia is worse- has 24/7 help at home\par got covid vax x 3\par bp has been good- ambi readings reviewed\par ambulating w walker and assistance- unsteady\par no chest pain, dyspnea or palpitations or edema\par main comp is knee pain, wt gain, unsteady gait\par \par \par \par \par \par

## 2022-05-02 ENCOUNTER — RX RENEWAL (OUTPATIENT)
Age: 87
End: 2022-05-02

## 2022-05-02 RX ORDER — LEVOTHYROXINE SODIUM 0.07 MG/1
75 TABLET ORAL
Qty: 90 | Refills: 0 | Status: ACTIVE | COMMUNITY
Start: 2017-12-29 | End: 1900-01-01

## 2022-05-02 RX ORDER — METOPROLOL TARTRATE 50 MG/1
50 TABLET, FILM COATED ORAL
Qty: 180 | Refills: 0 | Status: ACTIVE | COMMUNITY
Start: 2017-03-12 | End: 1900-01-01

## 2022-06-14 RX ORDER — OXYBUTYNIN CHLORIDE 10 MG/1
10 TABLET, EXTENDED RELEASE ORAL
Qty: 90 | Refills: 1 | Status: ACTIVE | COMMUNITY
Start: 2018-10-12 | End: 1900-01-01

## 2022-11-28 NOTE — ED ADULT TRIAGE NOTE - TEMPERATURE IN FAHRENHEIT (DEGREES F)
98.1 Bilobed Flap Text: The defect edges were debeveled with a #15 scalpel blade.  Given the location of the defect and the proximity to free margins a bilobe flap was deemed most appropriate.  Using a sterile surgical marker, an appropriate bilobe flap drawn around the defect.    The area thus outlined was incised deep to adipose tissue with a #15 scalpel blade.  The skin margins were undermined to an appropriate distance in all directions utilizing iris scissors.

## 2023-01-11 NOTE — PHYSICAL THERAPY INITIAL EVALUATION ADULT - SITTING BALANCE: DYNAMIC
Please schedule infusion appointments Paddy Rodriguez PA-C ordered for patient  Patient has a virtual follow up scheduled 4/14/23 and will need a mammogram scheduled on order for next available appointment  good balance

## 2023-03-17 ENCOUNTER — EMERGENCY (EMERGENCY)
Facility: HOSPITAL | Age: 88
LOS: 1 days | Discharge: HOME | End: 2023-03-17
Attending: EMERGENCY MEDICINE | Admitting: EMERGENCY MEDICINE
Payer: MEDICARE

## 2023-03-17 VITALS
HEIGHT: 64 IN | TEMPERATURE: 98 F | DIASTOLIC BLOOD PRESSURE: 67 MMHG | WEIGHT: 151.02 LBS | OXYGEN SATURATION: 93 % | HEART RATE: 80 BPM | SYSTOLIC BLOOD PRESSURE: 214 MMHG | RESPIRATION RATE: 18 BRPM

## 2023-03-17 DIAGNOSIS — R45.1 RESTLESSNESS AND AGITATION: ICD-10-CM

## 2023-03-17 DIAGNOSIS — Z90.49 ACQUIRED ABSENCE OF OTHER SPECIFIED PARTS OF DIGESTIVE TRACT: Chronic | ICD-10-CM

## 2023-03-17 DIAGNOSIS — E03.9 HYPOTHYROIDISM, UNSPECIFIED: ICD-10-CM

## 2023-03-17 DIAGNOSIS — Z87.39 PERSONAL HISTORY OF OTHER DISEASES OF THE MUSCULOSKELETAL SYSTEM AND CONNECTIVE TISSUE: ICD-10-CM
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
## 2023-03-17 DIAGNOSIS — Z86.79 PERSONAL HISTORY OF OTHER DISEASES OF THE CIRCULATORY SYSTEM: ICD-10-CM

## 2023-03-17 DIAGNOSIS — F03.90 UNSPECIFIED DEMENTIA WITHOUT BEHAVIORAL DISTURBANCE: ICD-10-CM

## 2023-03-17 DIAGNOSIS — Z86.711 PERSONAL HISTORY OF PULMONARY EMBOLISM: ICD-10-CM

## 2023-03-17 DIAGNOSIS — Z98.890 OTHER SPECIFIED POSTPROCEDURAL STATES: Chronic | ICD-10-CM

## 2023-03-17 DIAGNOSIS — I10 ESSENTIAL (PRIMARY) HYPERTENSION: ICD-10-CM

## 2023-03-17 DIAGNOSIS — Z86.718 PERSONAL HISTORY OF OTHER VENOUS THROMBOSIS AND EMBOLISM: ICD-10-CM
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
## 2023-03-17 DIAGNOSIS — Z88.8 ALLERGY STATUS TO OTHER DRUGS, MEDICAMENTS AND BIOLOGICAL SUBSTANCES STATUS: ICD-10-CM

## 2023-03-17 DIAGNOSIS — R35.0 FREQUENCY OF MICTURITION: ICD-10-CM

## 2023-03-17 DIAGNOSIS — Z20.822 CONTACT WITH AND (SUSPECTED) EXPOSURE TO COVID-19: ICD-10-CM

## 2023-03-17 DIAGNOSIS — E78.5 HYPERLIPIDEMIA, UNSPECIFIED: ICD-10-CM

## 2023-03-17 DIAGNOSIS — I48.91 UNSPECIFIED ATRIAL FIBRILLATION: ICD-10-CM

## 2023-03-17 DIAGNOSIS — Z90.49 ACQUIRED ABSENCE OF OTHER SPECIFIED PARTS OF DIGESTIVE TRACT: ICD-10-CM

## 2023-03-17 LAB
ANION GAP SERPL CALC-SCNC: 11 MMOL/L — SIGNIFICANT CHANGE UP (ref 5–17)
ANION GAP SERPL CALC-SCNC: 12 MMOL/L — SIGNIFICANT CHANGE UP (ref 5–17)
APPEARANCE UR: CLEAR — SIGNIFICANT CHANGE UP
BACTERIA # UR AUTO: PRESENT /HPF
BASE EXCESS BLDV CALC-SCNC: 1.3 MMOL/L — SIGNIFICANT CHANGE UP (ref -2–3)
BILIRUB UR-MCNC: NEGATIVE — SIGNIFICANT CHANGE UP
BUN SERPL-MCNC: 12 MG/DL — SIGNIFICANT CHANGE UP (ref 7–23)
BUN SERPL-MCNC: 12 MG/DL — SIGNIFICANT CHANGE UP (ref 7–23)
CA-I SERPL-SCNC: 1.19 MMOL/L — SIGNIFICANT CHANGE UP (ref 1.15–1.33)
CALCIUM SERPL-MCNC: 9.2 MG/DL — SIGNIFICANT CHANGE UP (ref 8.4–10.5)
CALCIUM SERPL-MCNC: 9.6 MG/DL — SIGNIFICANT CHANGE UP (ref 8.4–10.5)
CHLORIDE SERPL-SCNC: 101 MMOL/L — SIGNIFICANT CHANGE UP (ref 96–108)
CHLORIDE SERPL-SCNC: 103 MMOL/L — SIGNIFICANT CHANGE UP (ref 96–108)
CO2 BLDV-SCNC: 27.1 MMOL/L — HIGH (ref 22–26)
CO2 SERPL-SCNC: 26 MMOL/L — SIGNIFICANT CHANGE UP (ref 22–31)
CO2 SERPL-SCNC: 28 MMOL/L — SIGNIFICANT CHANGE UP (ref 22–31)
COLOR SPEC: YELLOW — SIGNIFICANT CHANGE UP
CREAT SERPL-MCNC: 0.42 MG/DL — LOW (ref 0.5–1.3)
CREAT SERPL-MCNC: 0.46 MG/DL — LOW (ref 0.5–1.3)
DIFF PNL FLD: ABNORMAL
EGFR: 86 ML/MIN/1.73M2 — SIGNIFICANT CHANGE UP
EGFR: 88 ML/MIN/1.73M2 — SIGNIFICANT CHANGE UP
EPI CELLS # UR: ABNORMAL /HPF (ref 0–5)
GAS PNL BLDV: 135 MMOL/L — LOW (ref 136–145)
GAS PNL BLDV: SIGNIFICANT CHANGE UP
GLUCOSE SERPL-MCNC: 131 MG/DL — HIGH (ref 70–99)
GLUCOSE SERPL-MCNC: 144 MG/DL — HIGH (ref 70–99)
GLUCOSE UR QL: NEGATIVE — SIGNIFICANT CHANGE UP
HCO3 BLDV-SCNC: 26 MMOL/L — SIGNIFICANT CHANGE UP (ref 22–29)
HCT VFR BLD CALC: 45.8 % — HIGH (ref 34.5–45)
HGB BLD-MCNC: 15.2 G/DL — SIGNIFICANT CHANGE UP (ref 11.5–15.5)
KETONES UR-MCNC: ABNORMAL MG/DL
LEUKOCYTE ESTERASE UR-ACNC: NEGATIVE — SIGNIFICANT CHANGE UP
MCHC RBC-ENTMCNC: 33.2 GM/DL — SIGNIFICANT CHANGE UP (ref 32–36)
MCHC RBC-ENTMCNC: 33.6 PG — SIGNIFICANT CHANGE UP (ref 27–34)
MCV RBC AUTO: 101.1 FL — HIGH (ref 80–100)
NITRITE UR-MCNC: NEGATIVE — SIGNIFICANT CHANGE UP
NRBC # BLD: 0 /100 WBCS — SIGNIFICANT CHANGE UP (ref 0–0)
PCO2 BLDV: 40 MMHG — SIGNIFICANT CHANGE UP (ref 39–42)
PH BLDV: 7.42 — SIGNIFICANT CHANGE UP (ref 7.32–7.43)
PH UR: 7.5 — SIGNIFICANT CHANGE UP (ref 5–8)
PLATELET # BLD AUTO: 305 K/UL — SIGNIFICANT CHANGE UP (ref 150–400)
PO2 BLDV: 67 MMHG — HIGH (ref 25–45)
POTASSIUM BLDV-SCNC: 4.2 MMOL/L — SIGNIFICANT CHANGE UP (ref 3.5–5.1)
POTASSIUM SERPL-MCNC: 4.4 MMOL/L — SIGNIFICANT CHANGE UP (ref 3.5–5.3)
POTASSIUM SERPL-MCNC: SIGNIFICANT CHANGE UP (ref 3.5–5.3)
POTASSIUM SERPL-SCNC: 4.4 MMOL/L — SIGNIFICANT CHANGE UP (ref 3.5–5.3)
POTASSIUM SERPL-SCNC: SIGNIFICANT CHANGE UP (ref 3.5–5.3)
PROT UR-MCNC: NEGATIVE MG/DL — SIGNIFICANT CHANGE UP
RBC # BLD: 4.53 M/UL — SIGNIFICANT CHANGE UP (ref 3.8–5.2)
RBC # FLD: 14.8 % — HIGH (ref 10.3–14.5)
RBC CASTS # UR COMP ASSIST: < 5 /HPF — SIGNIFICANT CHANGE UP
SAO2 % BLDV: 95.8 % — HIGH (ref 67–88)
SARS-COV-2 RNA SPEC QL NAA+PROBE: NEGATIVE — SIGNIFICANT CHANGE UP
SODIUM SERPL-SCNC: 140 MMOL/L — SIGNIFICANT CHANGE UP (ref 135–145)
SODIUM SERPL-SCNC: 141 MMOL/L — SIGNIFICANT CHANGE UP (ref 135–145)
SP GR SPEC: 1.02 — SIGNIFICANT CHANGE UP (ref 1–1.03)
UROBILINOGEN FLD QL: 0.2 E.U./DL — SIGNIFICANT CHANGE UP
WBC # BLD: 12.39 K/UL — HIGH (ref 3.8–10.5)
WBC # FLD AUTO: 12.39 K/UL — HIGH (ref 3.8–10.5)
WBC UR QL: < 5 /HPF — SIGNIFICANT CHANGE UP

## 2023-03-17 PROCEDURE — 82330 ASSAY OF CALCIUM: CPT

## 2023-03-17 PROCEDURE — 84132 ASSAY OF SERUM POTASSIUM: CPT

## 2023-03-17 PROCEDURE — 36415 COLL VENOUS BLD VENIPUNCTURE: CPT

## 2023-03-17 PROCEDURE — 80048 BASIC METABOLIC PNL TOTAL CA: CPT

## 2023-03-17 PROCEDURE — 81001 URINALYSIS AUTO W/SCOPE: CPT

## 2023-03-17 PROCEDURE — 87635 SARS-COV-2 COVID-19 AMP PRB: CPT

## 2023-03-17 PROCEDURE — 99285 EMERGENCY DEPT VISIT HI MDM: CPT

## 2023-03-17 PROCEDURE — 87086 URINE CULTURE/COLONY COUNT: CPT

## 2023-03-17 PROCEDURE — 85027 COMPLETE CBC AUTOMATED: CPT

## 2023-03-17 PROCEDURE — 99284 EMERGENCY DEPT VISIT MOD MDM: CPT

## 2023-03-17 PROCEDURE — 82803 BLOOD GASES ANY COMBINATION: CPT

## 2023-03-17 PROCEDURE — 84295 ASSAY OF SERUM SODIUM: CPT

## 2023-03-17 PROCEDURE — 90792 PSYCH DIAG EVAL W/MED SRVCS: CPT

## 2023-03-17 RX ORDER — SODIUM CHLORIDE 9 MG/ML
500 INJECTION INTRAMUSCULAR; INTRAVENOUS; SUBCUTANEOUS ONCE
Refills: 0 | Status: COMPLETED | OUTPATIENT
Start: 2023-03-17 | End: 2023-03-17

## 2023-03-17 RX ORDER — METOPROLOL TARTRATE 50 MG
50 TABLET ORAL ONCE
Refills: 0 | Status: COMPLETED | OUTPATIENT
Start: 2023-03-17 | End: 2023-03-17

## 2023-03-17 RX ORDER — METOPROLOL TARTRATE 50 MG
1 TABLET ORAL
Qty: 0 | Refills: 0 | DISCHARGE

## 2023-03-17 RX ADMIN — Medication 50 MILLIGRAM(S): at 20:42

## 2023-03-17 RX ADMIN — Medication 1 ENEMA: at 15:54

## 2023-03-17 RX ADMIN — SODIUM CHLORIDE 500 MILLILITER(S): 9 INJECTION INTRAMUSCULAR; INTRAVENOUS; SUBCUTANEOUS at 14:47

## 2023-03-17 RX ADMIN — Medication 50 MILLIGRAM(S): at 14:47

## 2023-03-17 NOTE — BH CONSULTATION LIAISON ASSESSMENT NOTE - NSSUICPROTFACT_PSY_ALL_CORE
Unable to assess Supportive social network of family or friends/Positive therapeutic relationships/Frustration tolerance

## 2023-03-17 NOTE — BH CONSULTATION LIAISON ASSESSMENT NOTE - NSBHATTESTCOMMENTATTENDFT_PSY_A_CORE
89F hx dementia, has 24/7 HHA, hx HTN, HLD, hypothyroidism, Afib, PE/DVT, presented to the ER s/p fall from bed.  No apparent injuries.  Psychiatry consulted as pt had recent poor sleep, agitation, med trials from PMD, they seek psychiatric ER consult.    Family describe pt in usual health, going to [employer], [place], recently with some disrupted sleep, and uncooperative with HHA at night.  Family report that PMD started lorazepam 0.5mg for sleep, increased with oversedation SE, switched to trazodone 50 with no effect, and have plan with him to increase dose, has been off Ativan for over 1wk.     Pt oriented x1, alert, no SI/HI.  Per pt assessment and family report (daughter, and HHA), no sx with acute dangerousness, teresa no psychosis, SI/HI, unsafe behaviors, physical violence, or sx c/w agbi or other mood episode.  No known PPHx teresa meds, admission, SI/SA.  No substance use.   Reference #: [id number]- lorazepam 0.5mg #30 on 2/24/23.  Pt with constipation, no BM in 3d, getting enema in the ER, may be contributing factor in presentation.  Labs reviewed.    No grounds for involuntary psychiatric admission at this time; admission not sought by pt/family. Family has plan to use trazodone per PMD.  Melatonin or low dose mirtazapine for sleep-wake regulation could be options though defer to outpt treaters.  Team d/w family importance of outpt neuro/psych evaluation and management, emphasize establishing outpt care as pt only sees PMD.    -psychiatrically cleared for dc  -ER medical w/u  -f/u within 1 week as above outpt  -please call if quesions

## 2023-03-17 NOTE — ED ADULT NURSE NOTE - CHIEF COMPLAINT QUOTE
Patient arrives via EMS from home, home attendant present.  Per report patient has hx dementia, fall out of bed today, home attendant denies head injury/LOC for patient.  Upon arrival eyes closed, patient yelling, scratching.  Speaks Tajik & Irish.

## 2023-03-17 NOTE — BH CONSULTATION LIAISON ASSESSMENT NOTE - NSBHCONSULTFOLLOWAFTERCARE_PSY_A_CORE FT
followup within 1 week [facility] as below, also outpt neurology  •	[facility]  o	[email or web address]  o	[address]  [address] [phone]  Alternatively:  •	[facility] ([facility]  o	[address] [place] [address]  o	Intake (leave a message): [phone]  o	[phone]  o	Adults only  •	[facility]  o	Multiple locations in [place]  o	Psychiatry and therapy services  o	Scheduling line: [phone]

## 2023-03-17 NOTE — ED ADULT NURSE NOTE - OBJECTIVE STATEMENT
89y Female Confused at baseline A&ox1 hx of dementia. BIBA c/o fall. Pt receiving 24 hours HHA. Pt noted to be sleepy. Aroudable to physical stimuli. Equal b/l chest rise noted. Per HHA at bedside pt "was combative and fell out of bed". Witnessed HHA denies head trauma, loc. No injury or deformities noted upon assessment. HX of HTN. Pt noted to be hypertensive. Did not take last dose of Medication. Pt ambulates with use of walker in home.

## 2023-03-17 NOTE — BH CONSULTATION LIAISON ASSESSMENT NOTE - NSBHCHARTREVIEWVS_PSY_A_CORE FT
Vital Signs Last 24 Hrs  T(C): 36.5 (17 Mar 2023 09:45), Max: 36.5 (17 Mar 2023 09:45)  T(F): 97.7 (17 Mar 2023 09:45), Max: 97.7 (17 Mar 2023 09:45)  HR: 75 (17 Mar 2023 11:58) (75 - 82)  BP: 158/77 (17 Mar 2023 11:58) (155/76 - 214/67)  BP(mean): --  RR: 17 (17 Mar 2023 11:58) (17 - 18)  SpO2: 95% (17 Mar 2023 11:58) (93% - 95%)    Parameters below as of 17 Mar 2023 11:58  Patient On (Oxygen Delivery Method): room air

## 2023-03-17 NOTE — ED PROVIDER NOTE - CARE PROVIDER_API CALL
Regino Prieto)  Cardiovascular Disease; Internal Medicine; Nuclear Cardiology  [address] [place] [address]  Phone: [phone]  Fax: [phone]  Follow Up Time:

## 2023-03-17 NOTE — ED ADULT NURSE REASSESSMENT NOTE - NS ED NURSE REASSESS COMMENT FT1
Pt sleeping at this time. Equal b/l chest rise noted. Easily arousable. Refusing To take PO medication. MD Director made aware.

## 2023-03-17 NOTE — ED PROVIDER NOTE - OBJECTIVE STATEMENT
89 yo F h/o dementia, htn, hld, hypothyroid, afib, PE/dvt c/o slip out of bed onto the floor w/o sig injury.  History via HHA; pt sleeping and when woken, does not answer questions.  HHA states pt was pulling on the chucks underneath her then slid off the bed.  No head trauma, loc, no apparent injury.  Daughter reports pt v agitated and not improving w the varying meds her pmd is prescribing for her behavior.  Daughter expressing frustration w current situation bc mother is agitated, not sleeping and falling but does not qualify for NH care.

## 2023-03-17 NOTE — BH CONSULTATION LIAISON ASSESSMENT NOTE - CURRENT MEDICATION
Size Of Lesion: - X Size Of Lesion In Cm (Optional): 0 Detail Level: Detailed Incorporate Mauc In Note: Yes Body Location Override (Optional - Billing Will Still Be Based On Selected Body Map Location If Applicable): left medial cheek MEDICATIONS  (STANDING):    MEDICATIONS  (PRN):

## 2023-03-17 NOTE — ED PROVIDER NOTE - PHYSICAL EXAMINATION
VITAL SIGNS: I have reviewed nursing notes and confirm.  CONSTITUTIONAL:  in no acute distress. sleeping, swearing at me as I am examining her  SKIN:  warm and dry, no acute rash.   HEAD:  normocephalic, atraumatic.  EYES: PERRL, EOM intact; conjunctiva and sclera clear.  ENT: No nasal discharge; airway clear.   NECK: Supple; non tender.  CARD: S1, S2 normal; no murmurs, gallops, or rubs. Regular rate and rhythm.   RESP:  Clear to auscultation b/l, no wheezes, rales or rhonchi.  ABD: Normal bowel sounds; soft; non-distended; non-tender; no guarding/ rebound.  MSK: Normal ROM. No clubbing, cyanosis or edema. no ttp bilat le, neck and back nontender midline, no apparent rib ttp  NEURO: wakes to touch/voice, campo, uncooperative w exam, grossly unremarkable  PSYCH: Cooperative, mood and affect appropriate.

## 2023-03-17 NOTE — ED PROVIDER NOTE - CLINICAL SUMMARY MEDICAL DECISION MAKING FREE TEXT BOX
Pt w mechanical slip off the bed w/o apparent injury.  Pt sleeping but I would like to ensure she can walk before dc.  Pt uncooperative w waking or walking at this time.  Will cont to follow.  Will also consult SW/CM to meet w pt's daughter regarding home care, placement concerns.  Reassess. See progress notes for further mdm related documentation.

## 2023-03-17 NOTE — ED PROVIDER NOTE - NSFOLLOWUPINSTRUCTIONS_ED_ALL_ED_FT
Dementia  Fall    Please follow up with Dr Prieto; consider follow up with neurology as well.    Increase the trazadone to 100 mg at bedtime to try to help with sleep.

## 2023-03-17 NOTE — ED PROVIDER NOTE - PROGRESS NOTE DETAILS
Pt's daughter in ed - reports pt more agitated the past few nights.  Pt was given lorazepam which worked well for a few nights and then didn't work but doubling the dose was too sedating.  Pt then tried trazodone but it made her more agitated.  Daughter also reports no bm x 3 d, usually has bm q 2d.  She also notes urinary freq.  Will check labs, urine.  Pt cont to be sleeping comfortably.  Will consult psych for med recommendations. Pt awake and more kind to staff.  Pt eval by psych who recommend increasing trazadone to 100 mg qhs and fu w neuro.  Labs w hemolyzed K, rpt pending but vbg w nl K.  UA neg.  CBC pending; lab contacted and are now running it - delayed bc of labeling issue.  Pt's daughter, Alina [phone], updated.  Plan dc if cbc/bmp ok; pt signed out to Dr Bright. CBC w elevated wbc; ua neg but u cx pending. Afebrile.  No apparent source of infection at this time.  Chem deon.  [place] st[place]ens. Pt awake and more kind to staff.  Pt eval by psych who recommend increasing trazadone to 100 mg qhs and fu w neuro.  Labs w hemolyzed K, rpt pending but vbg w nl K.  UA neg.  CBC pending; lab contacted and are now running it - delayed bc of labeling issue.  Pt's daughter, Alina [phone], updated.

## 2023-03-17 NOTE — ED PROVIDER NOTE - PATIENT PORTAL LINK FT
You can access the FollowMyHealth Patient Portal offered by [facility] by registering at the following website: [email or web address]. By joining [employer] [facility]’s FollowMyHealth portal, you will also be able to view your health information using other applications (apps) compatible with our system.

## 2023-03-17 NOTE — BH CONSULTATION LIAISON ASSESSMENT NOTE - NSICDXPASTMEDICALHX_GEN_ALL_CORE_FT
PAST MEDICAL HISTORY:  Arrhythmia     Dementia     DVT (deep venous thrombosis)     Essential hypertension Hypertension    Hyperlipidemia Hyperlipidemia    Hypothyroidism Hypothyroid    Insomnia Insomnia    PE (pulmonary thromboembolism)

## 2023-03-17 NOTE — BH CONSULTATION LIAISON ASSESSMENT NOTE - SUMMARY
89 yo F h/o dementia, htn, hld, hypothyroid, afib, PE/dvt c/o slip out of bed onto the floor w/o sig injury.  History via HHA; pt sleeping and when woken, does not answer questions.  HHA states pt was pulling on the chucks underneath her then slid off the bed.  ED r/o'ed head trauma, loc, no apparent injury. Patient constipated for 3 days, received saline enema in ED. Patient examined, oriented to self, lying in bed, occasionally responds to questions with "yes." Per daughter, patient has had two months of worsening interruption of sleep, and confusion and disorientation worse at night. Patient can be uncooperative with aids, resulted in fall which brought her in. Patient not deemed to be at risk of suicide or violent behavior.    Plan  - Patient safe from psychiatric standpoint for discharge. No need for psychiatric hospitalization as patient has 24/7 HHA service and no acute risk to self or others.  - Patient should follow up outpatient and continue care and recommendations of PCP- daughter indicated PCP advised doubling Trazadone dose to 100mg   - Will provide resources on establishing outpatient care with a specialist- neurologist would be appropriate given the dementia.   - Patient stopped Ativan last week appropriately and should continue not taking.

## 2023-03-17 NOTE — ED ADULT NURSE REASSESSMENT NOTE - NS ED NURSE REASSESS COMMENT FT1
Pt daughter updated as to plan of care with MD Director and Social work at bedside. Verbalized understanding.

## 2023-03-17 NOTE — ED PROVIDER NOTE - NSICDXPASTMEDICALHX_GEN_ALL_CORE_FT
PAST MEDICAL HISTORY:  Arrhythmia     Dementia     DVT (deep venous thrombosis)     Essential hypertension Hypertension    Hyperlipidemia Hyperlipidemia    Hypothyroidism Hypothyroid    Insomnia Insomnia    PE (pulmonary thromboembolism)      Benzoyl Peroxide Counseling: Patient counseled that medicine may cause skin irritation and bleach clothing.  In the event of skin irritation, the patient was advised to reduce the amount of the drug applied or use it less frequently.   The patient verbalized understanding of the proper use and possible adverse effects of benzoyl peroxide.  All of the patient's questions and concerns were addressed.

## 2023-03-17 NOTE — BH CONSULTATION LIAISON ASSESSMENT NOTE - HPI (INCLUDE ILLNESS QUALITY, SEVERITY, DURATION, TIMING, CONTEXT, MODIFYING FACTORS, ASSOCIATED SIGNS AND SYMPTOMS)
89 yo F h/o dementia, htn, hld, hypothyroid, afib, PE/dvt c/o slip out of bed onto the floor w/o sig injury.  History via HHA; pt sleeping and when woken, does not answer questions.  HHA states pt was pulling on the chucks underneath her then slid off the bed.  ED r/o'ed head trauma, loc, no apparent injury. Patient constipated for 3 days, received saline enema in ED.    Psychiatry consulted for evaluation regarding recent insomnia and agitation. Per HHA and daughter, patient was clinically diagnosed with dementia in 2018 after a fall regarding rehab. She does not follow with a neurologist or psychiatrist. She fell and broke her hip 3 years ago at which time a brain scan revealed findings consistent with dementia. At baseline patient is oriented to self, place, recognizes and interacts with daughter and aids, goes to museums, sings in choir group. However she has intermittent confusion, disorientation, combativeness with aids, as well as occasional hallucinations of seeing her mother and sister. These often occur at night, and she would have two nights a month in which these episodes would occur. In the past two months these episodes have increased in frequency to almost nightly, and the patient has had insomnia, and wakes up every 30 minutes or hour and tries to get out of bed. The patient has fallen recently but did not injure herself. Pt had covid in December, did not require hospitalization. Per daughter she refuses to see other physicians besides her PCP Dr. Regino Prieto who she likes and listens to. At the annual visit two weeks ago he prescribed 0.5mg Lorazepam for sleep which helped her sleep 5 hours through the night for 3 days, but then the effects wore off. They doubled the dose per PCP's rec and she was very sedated and could not move, and d/c'ed Lorazepam this past weekend. She eventually recovered and slept all day Monday, then Tuesday night was given Trazadone 50mg for sleep but woke up every hour confused or trying to get out of bed or to urinate. The plan was to double the dose but the patient had the fall and was brought in to the ED.    Daughter denies that the patient is suicidal, or a risk to her self or others. No violent behavior history. Has 24/7 HHA service at home, lives alone with the aids. Daughter is very available and managing her care. No history of psychosis, suicide attempts.     Patient seen in ED, resting comfortably, calmly in bed with HHA bedside. Oriented to self only, indicated pain in her left shoulder which the HHA clarified as chronic pain. Either did not respond to rest of questions or answered "yes." Per HHA and phone call with daughter

## 2023-03-17 NOTE — BH CONSULTATION LIAISON ASSESSMENT NOTE - RISK ASSESSMENT
Patient uncooperative with aids at times and has dementia with episodes of low cognition, orientation, concentration. However patient has no suicidal ideations, no intent or plan. No history of violence.

## 2023-03-17 NOTE — ED ADULT TRIAGE NOTE - CHIEF COMPLAINT QUOTE
Patient arrives via EMS from home, home attendant present.  Per report patient has hx dementia, fall out of bed today, home attendant denies head injury/LOC for patient.  Upon arrival eyes closed, patient yelling, scratching.  Speaks [place] & [place].

## 2023-03-17 NOTE — BH CONSULTATION LIAISON ASSESSMENT NOTE - NSBHCHARTREVIEWLAB_PSY_A_CORE FT
CBC Full  -  ( 17 Mar 2023 17:00 )  WBC Count : 12.39 K/uL  RBC Count : 4.53 M/uL  Hemoglobin : 15.2 g/dL  Hematocrit : 45.8 %  Platelet Count - Automated : 305 K/uL  Mean Cell Volume : 101.1 fl  Mean Cell Hemoglobin : 33.6 pg  Mean Cell Hemoglobin Concentration : 33.2 gm/dL  Auto Neutrophil # : x  Auto Lymphocyte # : x  Auto Monocyte # : x  Auto Eosinophil # : x  Auto Basophil # : x  Auto Neutrophil % : x  Auto Lymphocyte % : x  Auto Monocyte % : x  Auto Eosinophil % : x  Auto Basophil % : x    Urinalysis Basic - ( 17 Mar 2023 16:06 )    Color: Yellow / Appearance: Clear / S.020 / pH: x  Gluc: x / Ketone: Trace mg/dL  / Bili: Negative / Urobili: 0.2 E.U./dL   Blood: x / Protein: NEGATIVE mg/dL / Nitrite: NEGATIVE   Leuk Esterase: NEGATIVE / RBC: < 5 /HPF / WBC < 5 /HPF   Sq Epi: x / Non Sq Epi: 5-10 /HPF / Bacteria: Present /HPF

## 2023-03-17 NOTE — ED ADULT NURSE REASSESSMENT NOTE - NS ED NURSE REASSESS COMMENT FT1
Blood work sent to lab. Pt placed on Primafit. Pt able to tolerate po at this time. IV bolus started. Pending UA, UC collection.

## 2023-03-18 VITALS
SYSTOLIC BLOOD PRESSURE: 186 MMHG | RESPIRATION RATE: 18 BRPM | OXYGEN SATURATION: 95 % | HEART RATE: 72 BPM | DIASTOLIC BLOOD PRESSURE: 82 MMHG

## 2023-03-21 LAB
CULTURE RESULTS: SIGNIFICANT CHANGE UP
SPECIMEN SOURCE: SIGNIFICANT CHANGE UP

## 2024-01-01 NOTE — ED ADULT TRIAGE NOTE - PATIENT ON (OXYGEN DELIVERY METHOD)
Discharge teaching completed for infant.  Parents voiced understanding of instructions.  Questions answered.   room air